# Patient Record
Sex: FEMALE | Race: WHITE | NOT HISPANIC OR LATINO | Employment: UNEMPLOYED
[De-identification: names, ages, dates, MRNs, and addresses within clinical notes are randomized per-mention and may not be internally consistent; named-entity substitution may affect disease eponyms.]

---

## 2018-05-29 ENCOUNTER — RECORDS - HEALTHEAST (OUTPATIENT)
Dept: ADMINISTRATIVE | Facility: OTHER | Age: 53
End: 2018-05-29

## 2018-07-19 ENCOUNTER — RECORDS - HEALTHEAST (OUTPATIENT)
Dept: ADMINISTRATIVE | Facility: OTHER | Age: 53
End: 2018-07-19

## 2018-07-31 ENCOUNTER — RECORDS - HEALTHEAST (OUTPATIENT)
Dept: ADMINISTRATIVE | Facility: OTHER | Age: 53
End: 2018-07-31

## 2018-08-01 ENCOUNTER — RECORDS - HEALTHEAST (OUTPATIENT)
Dept: ADMINISTRATIVE | Facility: OTHER | Age: 53
End: 2018-08-01

## 2018-08-03 ENCOUNTER — RECORDS - HEALTHEAST (OUTPATIENT)
Dept: ADMINISTRATIVE | Facility: OTHER | Age: 53
End: 2018-08-03

## 2018-08-17 ENCOUNTER — INFUSION - HEALTHEAST (OUTPATIENT)
Dept: INFUSION THERAPY | Facility: CLINIC | Age: 53
End: 2018-08-17

## 2018-08-17 ENCOUNTER — RECORDS - HEALTHEAST (OUTPATIENT)
Dept: ADMINISTRATIVE | Facility: OTHER | Age: 53
End: 2018-08-17

## 2018-08-17 DIAGNOSIS — G25.81 RESTLESS LEGS SYNDROME (RLS): ICD-10-CM

## 2018-08-17 RX ORDER — TRAMADOL HYDROCHLORIDE 50 MG/1
50 TABLET ORAL EVERY 6 HOURS PRN
Status: SHIPPED | COMMUNITY
Start: 2018-08-17 | End: 2021-07-20

## 2018-09-04 ENCOUNTER — RECORDS - HEALTHEAST (OUTPATIENT)
Dept: ADMINISTRATIVE | Facility: OTHER | Age: 53
End: 2018-09-04

## 2018-09-07 ENCOUNTER — RECORDS - HEALTHEAST (OUTPATIENT)
Dept: ADMINISTRATIVE | Facility: OTHER | Age: 53
End: 2018-09-07

## 2020-04-08 ENCOUNTER — COMMUNICATION - HEALTHEAST (OUTPATIENT)
Dept: SCHEDULING | Facility: CLINIC | Age: 55
End: 2020-04-08

## 2020-04-09 ENCOUNTER — COMMUNICATION - HEALTHEAST (OUTPATIENT)
Dept: ADMINISTRATIVE | Facility: CLINIC | Age: 55
End: 2020-04-09

## 2020-04-09 ENCOUNTER — OFFICE VISIT - HEALTHEAST (OUTPATIENT)
Dept: FAMILY MEDICINE | Facility: CLINIC | Age: 55
End: 2020-04-09

## 2020-04-09 DIAGNOSIS — Z12.11 SCREEN FOR COLON CANCER: ICD-10-CM

## 2020-04-09 DIAGNOSIS — Z12.31 VISIT FOR SCREENING MAMMOGRAM: ICD-10-CM

## 2020-04-09 DIAGNOSIS — M54.6 LEFT-SIDED THORACIC BACK PAIN, UNSPECIFIED CHRONICITY: ICD-10-CM

## 2020-04-09 ASSESSMENT — MIFFLIN-ST. JEOR: SCORE: 1389.21

## 2021-05-28 ASSESSMENT — ASTHMA QUESTIONNAIRES: ACT_TOTALSCORE: 12

## 2021-06-04 VITALS — HEIGHT: 61 IN | BODY MASS INDEX: 35.87 KG/M2 | WEIGHT: 190 LBS

## 2021-06-07 NOTE — TELEPHONE ENCOUNTER
"Pt is calling    C/O sharp stabbing pain under her left rib and around to the back shoulder blade  Pain has occurred on and off x 2 weeks  Pain is now constant tonight  Tender to the touch. Touching does make the pain worsen.    C/O sweats and chills on and on.  Unsure if she has a  fever as she does not have a thermometer, but feels warm  Pain is a 7 on the pain scale.  Tylenol does help with the pain and the chills  Hasn't taken any Tylenol today.    No cough, nasal congestion, or runny nose  No chest pain or shortness of breath.  No urinary urgency, frequency, or dysuria  No vomiting  No recent injury      Reason for Disposition    Pain lasting > 10 minutes and over 50 years old    Answer Assessment - Initial Assessment Questions  1. LOCATION: \"Where does it hurt?\"       Left sided rib pain. Bottom rib.  Pain goes around to her back  2. RADIATION: \"Does the pain shoot anywhere else?\" (e.g., chest, back)      Radiates to her back  3. ONSET: \"When did the pain begin?\" (e.g., minutes, hours or days ago)       Started 2 weeks ago, off and on, and this evening has become constant  4. SUDDEN: \"Gradual or sudden onset?\"       Gradual onset that has worsened tonight and now is constant  5. PATTERN \"Does the pain come and go, or is it constant?\"     - If constant: \"Is it getting better, staying the same, or worsening?\"       (Note: Constant means the pain never goes away completely; most serious pain is constant and it progresses)      - If intermittent: \"How long does it last?\" \"Do you have pain now?\"      (Note: Intermittent means the pain goes away completely between bouts)      Constant pain  6. SEVERITY: \"How bad is the pain?\"  (e.g., Scale 1-10; mild, moderate, or severe)     - MILD (1-3): doesn't interfere with normal activities, abdomen soft and not tender to touch      - MODERATE (4-7): interferes with normal activities or awakens from sleep, tender to touch      - SEVERE (8-10): excruciating pain, doubled over, " "unable to do any normal activities        7 on the pain scale. Moderate pain  7. RECURRENT SYMPTOM: \"Have you ever had this type of abdominal pain before?\" If so, ask: \"When was the last time?\" and \"What happened that time?\"       No  8. AGGRAVATING FACTORS: \"Does anything seem to cause this pain?\" (e.g., foods, stress, alcohol)      Tender to the touch.  Seems to worsen when she pushes on the area  9. CARDIAC SYMPTOMS: \"Do you have any of the following symptoms: chest pain, difficulty breathing, sweating, nausea?\"      No chest pain, shortness of breath, or nausea  10. OTHER SYMPTOMS: \"Do you have any other symptoms?\" (e.g., fever, vomiting, diarrhea)        Diarrhea x 2 days, but stated that she does have IBS  11. PREGNANCY: \"Is there any chance you are pregnant?\" \"When was your last menstrual period?\"        No.  Menopausal.    Protocols used: ABDOMINAL PAIN - UPPER-A-OH    I advised her that she should be seen in the ED.  Patient would like to schedule a TV for tomorrow.  Protocol reviewed with patient.  I advised her to schedule a TV for first think in the AM.  Appt made for tomorrow at 0800 with Dr Fischer.  Encouraged her to call back and/or go to the ED with any increase in pain, pain not relieved or with home care measures discussed, or with any new or worsening signs, symptoms, or concerns.    Patient verbalized understanding.    Peggy Mauricio RN Triage Nurse Advisors    "

## 2021-06-07 NOTE — PROGRESS NOTES
"Mary Frank is a 54 y.o. female who is being evaluated via a billable telephone visit.      The patient has been notified of following:     \"This telephone visit will be conducted via a call between you and your physician/provider. We have found that certain health care needs can be provided without the need for a physical exam.  This service lets us provide the care you need with a short phone conversation.  If a prescription is necessary we can send it directly to your pharmacy.  If lab work is needed we can place an order for that and you can then stop by our lab to have the test done at a later time.    Telephone visits are billed at different rates depending on your insurance coverage. During this emergency period, for some insurers they may be billed the same as an in-person visit.  Please reach out to your insurance provider with any questions.    If during the course of the call the physician/provider feels a telephone visit is not appropriate, you will not be charged for this service.\"    Patient has given verbal consent to a Telephone visit? Yes    Patient would like to receive their AVS by AVS Preference: Daisy.    Mary Frank complains of    Chief Complaint   Patient presents with     Pain     Left sided pain that radiates form her ribs to her back.  stabbing pain for the past couple days     She is a new patient to [facility]. Her PCP is through [facility]. She reports that \"he doesn't seem to care\". She has a medical history of:  HTN, Diabetes, Back pain, costochondritis, back disc herniations.      She reports that she is having left sided stabbing pain that is radiating around to the left side of her back.  She reports that \"something hurts\" it is constant but the stabbing comes and goes. She denies any shortness of breath and reports history of asthma. She is having night sweats and chills. She is not sure if she has fevers. She has a side ache that is radiating. Bones are feeling tender " and sore to touch. Pain goes to below the shoulder blade.   Nurse triage advised patient to be seen in the ER, pt Reports that she was not told to go to the emergency department but to rest and take some Tylenol and schedule a telephone visit.Documentation supports that the nurse triage advised. Patient to go to the emergency department and patient had declined.    She didn't specifically having any shortness of breath, or cardiac source chest pain.But states that the pain in general is worsening.    I have reviewed and updated the patient's Past Medical History, Social History, Family History and Medication List.    ALLERGIES  Amoxicillin; Penicillins; and Tramadol    Additional provider notes: Advised patient that I cannot adequately assess her symptoms as a telephone visit and I recommend that she have a face-to-face visit either at [facility] or through her primary care provider.In general patient sounds well but is anxious. She does not sound short of breath.    Assessment/Plan:  1. Left-sided thoracic back pain, unspecified chronicity     2. Screen for colon cancer     3. Visit for screening mammogram       Patient having left-sided thoracic back pain. Quick review of her history reveals a history of back pain, joint pain, and several disc herniations. She also has a history of costochondritis. I'm not able to adequately evaluate patient as a telephone visit and I recommended that she be either seen in a clinic for face-to-face visit or present to urgent care for further evaluation.     Patient is in agreement with this plan. We will assist patient in scheduling a face-to-face appointment. I discussed with patient that she may be advised presents emergency department.          Phone call duration:  11 minutes    Ada Fischer, CNP

## 2021-06-07 NOTE — TELEPHONE ENCOUNTER
Left message to call back for: Pt to call back on mainline to schedule an appointment and also to add in her insurance information    Information to relay to patient:  Pt had a Video visit today with Ada Fischer, the provider stated the Pt should schedule an appointment to be seen by any provider at the [facility] clinic who is seen Pts in clinic.    Please help her schedule an OV when she calls back.    Also we do not have any insurance information on file.  Please obtain insurance information into registration.

## 2021-06-16 PROBLEM — G25.81 RESTLESS LEGS SYNDROME (RLS): Status: ACTIVE | Noted: 2018-08-02

## 2021-06-19 NOTE — PROGRESS NOTES
Pt here for injectafer infusion. IV placed without difficulty. VSS. She c/o of mild nausea following infusion, relieved by peppermint essential oil. 30 min post VSS and IV d/c'd. She left ambulatory per self. Katrina Rader

## 2021-06-26 ENCOUNTER — HEALTH MAINTENANCE LETTER (OUTPATIENT)
Age: 56
End: 2021-06-26

## 2021-07-20 ENCOUNTER — HOSPITAL ENCOUNTER (OUTPATIENT)
Facility: CLINIC | Age: 56
Setting detail: OBSERVATION
Discharge: HOME | End: 2021-07-21
Attending: STUDENT IN AN ORGANIZED HEALTH CARE EDUCATION/TRAINING PROGRAM | Admitting: INTERNAL MEDICINE

## 2021-07-20 ENCOUNTER — HOSPITAL ENCOUNTER (EMERGENCY)
Dept: CT IMAGING | Facility: CLINIC | Age: 56
End: 2021-07-20

## 2021-07-20 DIAGNOSIS — R11.2 NAUSEA WITH VOMITING: ICD-10-CM

## 2021-07-20 DIAGNOSIS — K29.70 GASTRITIS, PRESENCE OF BLEEDING UNSPECIFIED, UNSPECIFIED CHRONICITY, UNSPECIFIED GASTRITIS TYPE: ICD-10-CM

## 2021-07-20 DIAGNOSIS — K92.2 UPPER GI BLEED: ICD-10-CM

## 2021-07-20 DIAGNOSIS — I10 ESSENTIAL HYPERTENSION: Primary | ICD-10-CM

## 2021-07-20 DIAGNOSIS — R06.02 SHORTNESS OF BREATH: ICD-10-CM

## 2021-07-20 DIAGNOSIS — R07.9 CHEST PAIN, UNSPECIFIED TYPE: ICD-10-CM

## 2021-07-20 LAB
ALBUMIN SERPL-MCNC: 4.1 G/DL (ref 3.5–5)
ALP SERPL-CCNC: 117 U/L (ref 45–120)
ALT SERPL W P-5'-P-CCNC: 16 U/L (ref 0–45)
ANION GAP SERPL CALCULATED.3IONS-SCNC: 17 MMOL/L (ref 5–18)
AST SERPL W P-5'-P-CCNC: 15 U/L (ref 0–40)
BASOPHILS # BLD AUTO: 0 10E3/UL (ref 0–0.2)
BASOPHILS NFR BLD AUTO: 0 %
BILIRUB DIRECT SERPL-MCNC: 0.2 MG/DL
BILIRUB SERPL-MCNC: 1.3 MG/DL (ref 0–1)
BUN SERPL-MCNC: 11 MG/DL (ref 8–22)
CALCIUM SERPL-MCNC: 9.2 MG/DL (ref 8.5–10.5)
CHLORIDE BLD-SCNC: 102 MMOL/L (ref 98–107)
CO2 SERPL-SCNC: 19 MMOL/L (ref 22–31)
CREAT SERPL-MCNC: 0.74 MG/DL (ref 0.6–1.1)
D DIMER PPP FEU-MCNC: 0.51 UG/ML FEU (ref 0–0.5)
EOSINOPHIL # BLD AUTO: 0 10E3/UL (ref 0–0.7)
EOSINOPHIL NFR BLD AUTO: 0 %
ERYTHROCYTE [DISTWIDTH] IN BLOOD BY AUTOMATED COUNT: 14.1 % (ref 10–15)
ETHANOL SERPL-MCNC: <10 MG/DL
GFR SERPL CREATININE-BSD FRML MDRD: >90 ML/MIN/1.73M2
GLUCOSE BLD-MCNC: 201 MG/DL (ref 70–125)
HCT VFR BLD AUTO: 41.3 % (ref 35–47)
HGB BLD-MCNC: 12.2 G/DL (ref 11.7–15.7)
HGB BLD-MCNC: 12.6 G/DL (ref 11.7–15.7)
HGB BLD-MCNC: 14 G/DL (ref 11.7–15.7)
HOLD SPECIMEN: NORMAL
IMM GRANULOCYTES # BLD: 0.1 10E3/UL
IMM GRANULOCYTES NFR BLD: 1 %
LIPASE SERPL-CCNC: 9 U/L (ref 0–52)
LYMPHOCYTES # BLD AUTO: 1.1 10E3/UL (ref 0.8–5.3)
LYMPHOCYTES NFR BLD AUTO: 10 %
MAGNESIUM SERPL-MCNC: 1.5 MG/DL (ref 1.8–2.6)
MAGNESIUM SERPL-MCNC: 2.3 MG/DL (ref 1.8–2.6)
MCH RBC QN AUTO: 27.9 PG (ref 26.5–33)
MCHC RBC AUTO-ENTMCNC: 33.9 G/DL (ref 31.5–36.5)
MCV RBC AUTO: 82 FL (ref 78–100)
MONOCYTES # BLD AUTO: 0.6 10E3/UL (ref 0–1.3)
MONOCYTES NFR BLD AUTO: 5 %
NEUTROPHILS # BLD AUTO: 9 10E3/UL (ref 1.6–8.3)
NEUTROPHILS NFR BLD AUTO: 84 %
NRBC # BLD AUTO: 0 10E3/UL
NRBC BLD AUTO-RTO: 0 /100
PLATELET # BLD AUTO: 306 10E3/UL (ref 150–450)
POTASSIUM BLD-SCNC: 3.3 MMOL/L (ref 3.5–5)
POTASSIUM BLD-SCNC: 3.6 MMOL/L (ref 3.5–5)
PROT SERPL-MCNC: 7.3 G/DL (ref 6–8)
RBC # BLD AUTO: 5.02 10E6/UL (ref 3.8–5.2)
SARS-COV-2 RNA RESP QL NAA+PROBE: NEGATIVE
SODIUM SERPL-SCNC: 138 MMOL/L (ref 136–145)
TROPONIN I SERPL-MCNC: <0.01 NG/ML (ref 0–0.29)
TROPONIN I SERPL-MCNC: <0.01 NG/ML (ref 0–0.29)
TROPONIN T BLD-MCNC: 0 UG/L
WBC # BLD AUTO: 10.7 10E3/UL (ref 4–11)

## 2021-07-20 PROCEDURE — 84132 ASSAY OF SERUM POTASSIUM: CPT | Performed by: INTERNAL MEDICINE

## 2021-07-20 PROCEDURE — 84484 ASSAY OF TROPONIN QUANT: CPT | Performed by: PHYSICIAN ASSISTANT

## 2021-07-20 PROCEDURE — C9803 HOPD COVID-19 SPEC COLLECT: HCPCS

## 2021-07-20 PROCEDURE — 93005 ELECTROCARDIOGRAM TRACING: CPT | Performed by: EMERGENCY MEDICINE

## 2021-07-20 PROCEDURE — G0378 HOSPITAL OBSERVATION PER HR: HCPCS

## 2021-07-20 PROCEDURE — C9113 INJ PANTOPRAZOLE SODIUM, VIA: HCPCS | Performed by: PHYSICIAN ASSISTANT

## 2021-07-20 PROCEDURE — 36592 COLLECT BLOOD FROM PICC: CPT | Performed by: STUDENT IN AN ORGANIZED HEALTH CARE EDUCATION/TRAINING PROGRAM

## 2021-07-20 PROCEDURE — 83735 ASSAY OF MAGNESIUM: CPT | Performed by: PHYSICIAN ASSISTANT

## 2021-07-20 PROCEDURE — 96375 TX/PRO/DX INJ NEW DRUG ADDON: CPT

## 2021-07-20 PROCEDURE — 85025 COMPLETE CBC W/AUTO DIFF WBC: CPT | Performed by: PHYSICIAN ASSISTANT

## 2021-07-20 PROCEDURE — 96361 HYDRATE IV INFUSION ADD-ON: CPT

## 2021-07-20 PROCEDURE — 71275 CT ANGIOGRAPHY CHEST: CPT

## 2021-07-20 PROCEDURE — 87635 SARS-COV-2 COVID-19 AMP PRB: CPT | Performed by: PHYSICIAN ASSISTANT

## 2021-07-20 PROCEDURE — 93005 ELECTROCARDIOGRAM TRACING: CPT | Performed by: STUDENT IN AN ORGANIZED HEALTH CARE EDUCATION/TRAINING PROGRAM

## 2021-07-20 PROCEDURE — 83690 ASSAY OF LIPASE: CPT | Performed by: PHYSICIAN ASSISTANT

## 2021-07-20 PROCEDURE — 250N000013 HC RX MED GY IP 250 OP 250 PS 637: Performed by: PHYSICIAN ASSISTANT

## 2021-07-20 PROCEDURE — 250N000011 HC RX IP 250 OP 636: Performed by: PHYSICIAN ASSISTANT

## 2021-07-20 PROCEDURE — 36415 COLL VENOUS BLD VENIPUNCTURE: CPT | Performed by: STUDENT IN AN ORGANIZED HEALTH CARE EDUCATION/TRAINING PROGRAM

## 2021-07-20 PROCEDURE — 85018 HEMOGLOBIN: CPT | Performed by: INTERNAL MEDICINE

## 2021-07-20 PROCEDURE — 99285 EMERGENCY DEPT VISIT HI MDM: CPT | Mod: 25

## 2021-07-20 PROCEDURE — 258N000003 HC RX IP 258 OP 636: Performed by: PHYSICIAN ASSISTANT

## 2021-07-20 PROCEDURE — 99220 PR INITIAL OBSERVATION CARE,LEVEL III: CPT | Performed by: INTERNAL MEDICINE

## 2021-07-20 PROCEDURE — 36592 COLLECT BLOOD FROM PICC: CPT | Performed by: PHYSICIAN ASSISTANT

## 2021-07-20 PROCEDURE — 96365 THER/PROPH/DIAG IV INF INIT: CPT | Mod: 59

## 2021-07-20 PROCEDURE — 85379 FIBRIN DEGRADATION QUANT: CPT | Performed by: PHYSICIAN ASSISTANT

## 2021-07-20 PROCEDURE — 250N000013 HC RX MED GY IP 250 OP 250 PS 637: Performed by: STUDENT IN AN ORGANIZED HEALTH CARE EDUCATION/TRAINING PROGRAM

## 2021-07-20 PROCEDURE — 82077 ASSAY SPEC XCP UR&BREATH IA: CPT | Performed by: PHYSICIAN ASSISTANT

## 2021-07-20 PROCEDURE — 258N000003 HC RX IP 258 OP 636: Performed by: INTERNAL MEDICINE

## 2021-07-20 PROCEDURE — 84484 ASSAY OF TROPONIN QUANT: CPT | Performed by: INTERNAL MEDICINE

## 2021-07-20 PROCEDURE — 250N000009 HC RX 250: Performed by: PHYSICIAN ASSISTANT

## 2021-07-20 PROCEDURE — 83735 ASSAY OF MAGNESIUM: CPT | Performed by: INTERNAL MEDICINE

## 2021-07-20 PROCEDURE — 80053 COMPREHEN METABOLIC PANEL: CPT | Performed by: PHYSICIAN ASSISTANT

## 2021-07-20 PROCEDURE — 36415 COLL VENOUS BLD VENIPUNCTURE: CPT | Performed by: INTERNAL MEDICINE

## 2021-07-20 PROCEDURE — 250N000013 HC RX MED GY IP 250 OP 250 PS 637: Performed by: INTERNAL MEDICINE

## 2021-07-20 RX ORDER — ONDANSETRON 2 MG/ML
4 INJECTION INTRAMUSCULAR; INTRAVENOUS ONCE
Status: COMPLETED | OUTPATIENT
Start: 2021-07-20 | End: 2021-07-20

## 2021-07-20 RX ORDER — SUCRALFATE 1 G/1
1 TABLET ORAL ONCE
Status: COMPLETED | OUTPATIENT
Start: 2021-07-20 | End: 2021-07-20

## 2021-07-20 RX ORDER — SODIUM CHLORIDE 9 MG/ML
INJECTION, SOLUTION INTRAVENOUS CONTINUOUS
Status: DISCONTINUED | OUTPATIENT
Start: 2021-07-20 | End: 2021-07-21

## 2021-07-20 RX ORDER — POTASSIUM CHLORIDE 1.5 G/1.58G
40 POWDER, FOR SOLUTION ORAL ONCE
Status: COMPLETED | OUTPATIENT
Start: 2021-07-20 | End: 2021-07-20

## 2021-07-20 RX ORDER — ACETAMINOPHEN 650 MG/1
650 SUPPOSITORY RECTAL EVERY 4 HOURS PRN
Status: DISCONTINUED | OUTPATIENT
Start: 2021-07-20 | End: 2021-07-21 | Stop reason: HOSPADM

## 2021-07-20 RX ORDER — ACETAMINOPHEN 325 MG/1
650 TABLET ORAL EVERY 4 HOURS PRN
Status: DISCONTINUED | OUTPATIENT
Start: 2021-07-20 | End: 2021-07-21 | Stop reason: HOSPADM

## 2021-07-20 RX ORDER — LIDOCAINE 40 MG/G
CREAM TOPICAL
Status: DISCONTINUED | OUTPATIENT
Start: 2021-07-20 | End: 2021-07-21 | Stop reason: HOSPADM

## 2021-07-20 RX ORDER — MAGNESIUM SULFATE HEPTAHYDRATE 40 MG/ML
2 INJECTION, SOLUTION INTRAVENOUS ONCE
Status: COMPLETED | OUTPATIENT
Start: 2021-07-20 | End: 2021-07-20

## 2021-07-20 RX ORDER — DIPHENHYDRAMINE HYDROCHLORIDE 50 MG/ML
25 INJECTION INTRAMUSCULAR; INTRAVENOUS ONCE
Status: COMPLETED | OUTPATIENT
Start: 2021-07-20 | End: 2021-07-20

## 2021-07-20 RX ORDER — ONDANSETRON 2 MG/ML
4 INJECTION INTRAMUSCULAR; INTRAVENOUS EVERY 6 HOURS PRN
Status: DISCONTINUED | OUTPATIENT
Start: 2021-07-20 | End: 2021-07-21 | Stop reason: HOSPADM

## 2021-07-20 RX ORDER — ONDANSETRON 4 MG/1
4 TABLET, ORALLY DISINTEGRATING ORAL EVERY 8 HOURS PRN
Qty: 10 TABLET | Refills: 0 | Status: SHIPPED | OUTPATIENT
Start: 2021-07-20 | End: 2021-07-23

## 2021-07-20 RX ORDER — METOCLOPRAMIDE HYDROCHLORIDE 5 MG/ML
10 INJECTION INTRAMUSCULAR; INTRAVENOUS ONCE
Status: COMPLETED | OUTPATIENT
Start: 2021-07-20 | End: 2021-07-20

## 2021-07-20 RX ORDER — AMLODIPINE BESYLATE 5 MG/1
5 TABLET ORAL 2 TIMES DAILY
Qty: 60 TABLET | Refills: 0 | Status: SHIPPED | OUTPATIENT
Start: 2021-07-20 | End: 2021-07-21

## 2021-07-20 RX ORDER — AMLODIPINE BESYLATE 5 MG/1
5 TABLET ORAL 2 TIMES DAILY
Status: DISCONTINUED | OUTPATIENT
Start: 2021-07-20 | End: 2021-07-21 | Stop reason: HOSPADM

## 2021-07-20 RX ORDER — POTASSIUM CHLORIDE 20MEQ/15ML
40 LIQUID (ML) ORAL ONCE
Status: DISCONTINUED | OUTPATIENT
Start: 2021-07-20 | End: 2021-07-20

## 2021-07-20 RX ORDER — ALBUTEROL SULFATE 90 UG/1
1-2 AEROSOL, METERED RESPIRATORY (INHALATION) EVERY 4 HOURS PRN
Status: DISCONTINUED | OUTPATIENT
Start: 2021-07-20 | End: 2021-07-21 | Stop reason: HOSPADM

## 2021-07-20 RX ORDER — IOPAMIDOL 755 MG/ML
100 INJECTION, SOLUTION INTRAVASCULAR ONCE
Status: COMPLETED | OUTPATIENT
Start: 2021-07-20 | End: 2021-07-20

## 2021-07-20 RX ADMIN — LIDOCAINE HYDROCHLORIDE 30 ML: 20 SOLUTION ORAL; TOPICAL at 13:24

## 2021-07-20 RX ADMIN — SODIUM CHLORIDE: 9 INJECTION, SOLUTION INTRAVENOUS at 19:08

## 2021-07-20 RX ADMIN — ONDANSETRON 4 MG: 2 INJECTION INTRAMUSCULAR; INTRAVENOUS at 09:41

## 2021-07-20 RX ADMIN — PANTOPRAZOLE SODIUM 40 MG: 40 INJECTION, POWDER, FOR SOLUTION INTRAVENOUS at 09:43

## 2021-07-20 RX ADMIN — POTASSIUM CHLORIDE 40 MEQ: 1.5 POWDER, FOR SOLUTION ORAL at 12:05

## 2021-07-20 RX ADMIN — ACETAMINOPHEN 650 MG: 325 TABLET ORAL at 19:46

## 2021-07-20 RX ADMIN — SODIUM CHLORIDE 125 ML/HR: 9 INJECTION, SOLUTION INTRAVENOUS at 12:05

## 2021-07-20 RX ADMIN — IOPAMIDOL 100 ML: 755 INJECTION, SOLUTION INTRAVENOUS at 12:00

## 2021-07-20 RX ADMIN — SUCRALFATE 1 G: 1 TABLET ORAL at 13:47

## 2021-07-20 RX ADMIN — METOCLOPRAMIDE HYDROCHLORIDE 10 MG: 5 INJECTION INTRAMUSCULAR; INTRAVENOUS at 13:41

## 2021-07-20 RX ADMIN — MAGNESIUM SULFATE HEPTAHYDRATE 2 G: 40 INJECTION, SOLUTION INTRAVENOUS at 10:29

## 2021-07-20 RX ADMIN — DIPHENHYDRAMINE HYDROCHLORIDE 25 MG: 50 INJECTION INTRAMUSCULAR; INTRAVENOUS at 13:41

## 2021-07-20 RX ADMIN — LIDOCAINE HYDROCHLORIDE 30 ML: 20 SOLUTION ORAL; TOPICAL at 10:24

## 2021-07-20 RX ADMIN — SODIUM CHLORIDE 1000 ML: 9 INJECTION, SOLUTION INTRAVENOUS at 09:37

## 2021-07-20 RX ADMIN — AMLODIPINE BESYLATE 5 MG: 5 TABLET ORAL at 23:45

## 2021-07-20 ASSESSMENT — ENCOUNTER SYMPTOMS
FREQUENCY: 0
SORE THROAT: 0
TROUBLE SWALLOWING: 0
LIGHT-HEADEDNESS: 1
BACK PAIN: 0
WEAKNESS: 0
NAUSEA: 1
FEVER: 0
HEADACHES: 0
DYSURIA: 0
EYE DISCHARGE: 0
DIARRHEA: 0
HEMATURIA: 0
WOUND: 0
CHILLS: 1
ABDOMINAL PAIN: 1
NECK PAIN: 0
COUGH: 0
SHORTNESS OF BREATH: 1
NUMBNESS: 0
COUGH: 1
VOMITING: 1

## 2021-07-20 ASSESSMENT — ACTIVITIES OF DAILY LIVING (ADL)
CONCENTRATING,_REMEMBERING_OR_MAKING_DECISIONS_DIFFICULTY: NO
DOING_ERRANDS_INDEPENDENTLY_DIFFICULTY: NO
EATING/SWALLOWING: EATING
TOILETING_ISSUES: NO
DIFFICULTY_EATING/SWALLOWING: YES
HEARING_DIFFICULTY_OR_DEAF: NO
WALKING_OR_CLIMBING_STAIRS_DIFFICULTY: NO
DRESSING/BATHING_DIFFICULTY: NO
DIFFICULTY_COMMUNICATING: NO
FALL_HISTORY_WITHIN_LAST_SIX_MONTHS: NO

## 2021-07-20 ASSESSMENT — MIFFLIN-ST. JEOR
SCORE: 1283.75
SCORE: 1268.16

## 2021-07-20 NOTE — PLAN OF CARE
PRIMARY DIAGNOSIS: CHEST PAIN  OUTPATIENT/OBSERVATION GOALS TO BE MET BEFORE DISCHARGE:  1. Ruled out acute coronary syndrome (negative or stable Troponin): First 2 troponin <0.01  2. Pain Status: Pt c/o abdominal pain from multiple emesis. No emesis has been witnessed by staff since admit to unit.  3. Appropriate provocative testing performed: ECHO scheduled for am.  - Stress Test Procedure:NA  - Interpretation of cardiac rhythm per telemetry tech: NSR    4. Cleared by Consultants (if applicable): GI and cards consults ordered.  5. Return to near baseline physical activity: Pt up ad chito to BR  Discharge Planner Nurse   Safe discharge environment identified: Discharge to home when stable.  Barriers to discharge: Non specific EKG changes       Entered by: Judy Mesa 07/20/2021 5:04 PM     Please review provider order for any additional goals.   Nurse to notify provider when observation goals have been met and patient is ready for discharge.

## 2021-07-20 NOTE — ED NOTES
Pt refusing Covid test. Pt veins very small and flat so IV number 22 started in hand. Pt informed we may need larger IV if D dimer elevated. Verbalized understanding, just wanted nausea meds asap.

## 2021-07-20 NOTE — H&P
[facility] MEDICINE ADMISSION HISTORY AND PHYSICAL     Assessment & Plan      Mary Frank is a 55 year old old female with history of hypertension, GERD, hiatal hernia that has been surgically corrected twice in the past but she says has failed so far, presented with hematemesis and intractable nausea and vomiting last night after she drank moonshine.    Assessment & Plan    Intractable nausea and vomiting with hematemesis  -Exacerbated by hiatal hernia  -Follow hemoglobin  -So far no significant blood loss based on hemoglobin  -IV Protonix initiated  -This seems to have been triggered by intake of alcohol although patient says she has no history of alcohol abuse.  -GI consult  -ER reports that there was no episode of hematemesis observed during her ER stay.    Chest pain with nonspecific ST-T wave changes EKG  -Chest pain seems to be chronic rather than acute this has been going on for 2 months  -Does not seem to be typical as this happens even at rest  -Consider getting echocardiogram or stress test  -Serial troponins.  Initial troponin is negative.    Hiatal hernia  -Had an open correction of that at the age of 28.    -She had a laparoscopic repair again 3 years ago.  -PPI  -Had improvement with a GI cocktail    Essential hypertension  -Patient is on amlodipine and losartan but has not been compliant with medications because of insurance issues  -Optimize medications to control blood pressure if clinically indicated.    Hypokalemia and hypomagnesemia  -Replace per protocol  -Give IV fluids    Medications include Mestinon and tramadol and Norflex with patient says she is not on this medication and does not know the reason for these medications on her med list.  She has no chronic pain.  She has no history of myasthenia gravis as far she is concerned.    DVTP: Low Risk Patient  ; Will not give aspirin or anticoagulant because of the reported hematemesis.  Code Status: Full  code  Disposition: Observation   Goals for the hospitalization: Evaluate the hematemesis.  Evaluate the chest pain.    Chief Complaint Nausea, vomiing and left arm numbness; chest pain, cugh, chills. shorntes sof breath     HISTORY     Mary Frank is a 55 year old old female with h/o above p/w hematemesis    Patient drank moonshine yesterday around 5:56 PM.  She does have history of hiatal hernia and GERD in the past.  She has had 2 surgical repair of the hiatal hernia which she said has failed.  Yesterday after drinking lunch and she started having intractable nausea and vomiting with blood in her vomitus.  She said it was reddish but she did not have melena.  She said it was multiple times.      She also says that she has some loose stools but this is also been going on for some time.      She complains of some epigastric discomfort associated with the nausea and vomiting.    She still had episodes of nausea and bloody vomitus the following day prompting her to go to the ER today.  Her hemoglobin in the ER was normal.  She blames her hiatal hernia as the reason for the intractable nausea and vomiting.    Patient has not noted any melena in her stool previously.  She did say that she had stomach ulcers in the past.  She said that the repair of her hiatal hernia has failed, initially open surgery at age 28 then laparoscopic repair a few years ago.    Additionally she also complained of chest pain which has been going on for the past 2 months.  It occurs at rest and not necessarily related to movement.  She does not think it is heartburn or hiatal hernia related.  KG showed normal sinus rhythm with nonspecific ST-T wave changes.  Initial troponin was negative.  She has intermittent shortness of breath.  No tenderness or swelling of the calves however.  She denies any other respiratory symptoms such as cough or fever.  She does have risk factors notably hypertension dyslipidemia and type 2 diabetes with patient says  she has not been compliant with medications because of financial issues.    In the ER patient was given Protonix and GI cocktail which improved her symptoms.  D-dimer was elevated but CTA was negative for PE.  Patient also had normal hemoglobin.  Also mild hypokalemia and low magnesium.      Past Medical History     Past Medical History:  4/6/2015: Abdominal pain      Comment:  Overview:  CT ABDOMEN PELVIS [facility]WO, 4/6/2015 Impression:                No evidence of renal or ureteral calculus. No evidence of               a suspicious mass or inflammatory change within the                urinary system.   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 5 other adenomatous polyps. One               of the six adenomas was advanced based on size and                appearance under the microscope.  It is recommended based               on this along with the number of polyps that a                colonoscopy be repeated in one year.  Random colon                biopsies do not show colitis.    5/23/2013: Hyperlipidemia      Comment:  Overview:  She has dyslipidemia with elevated                triglycerides on 5-23-13.   6/13/2015: Lipoma      Comment:  Overview:    4/29/2015: Nonspecific abnormal finding in stool contents      Comment:  Overview:  OCCULT BLOOD IFOBT STOOL was positive on                4-29-15.   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 No pertinent surgical history.  Family History    Reviewed, and History reviewed. No pertinent family history.     Social  History      Social History     Tobacco Use     Smoking status: Former Smoker     Years: 2.00     Types: Cigarettes, Cigarettes     Quit date: 1994     Years since quittin.7     Smokeless tobacco: Never Used   Substance Use Topics     Alcohol use: Not Currently     Drug use: Not Currently      Allergies     Allergies   Allergen Reactions     Amoxicillin Palpitations     Penicillins Palpitations     Tramadol Unknown     Prior to Admission Medications      Prior to Admission Medications   Prescriptions Last Dose Informant Patient Reported? Taking?   HYDROcodone-acetaminophen 5-325 mg per tablet   No No   Sig: [HYDROCODONE-ACETAMINOPHEN 5-325 MG PER TABLET] Take 1 tablet by mouth every 4 (four) hours as needed for pain.   HYDROcodone-acetaminophen 5-325 mg per tablet   No No   Sig: [HYDROCODONE-ACETAMINOPHEN 5-325 MG PER TABLET] Take 1 tablet by mouth every 4 (four) hours as needed for pain.   albuterol (PROVENTIL HFA;VENTOLIN HFA) 90 mcg/actuation inhaler   Yes No   Sig: [ALBUTEROL (PROVENTIL HFA;VENTOLIN HFA) 90 MCG/ACTUATION INHALER] Inhale 1-2 puffs every 4 (four) hours as needed for wheezing or shortness of breath.   amLODIPine (NORVASC) 5 MG tablet   Yes No   Sig: [AMLODIPINE (NORVASC) 5 MG TABLET] Take 5 mg by mouth 2 (two) times a day.    amLODIPine (NORVASC) 5 MG tablet   No Yes   Sig: Take 1 tablet (5 mg) by mouth 2 times daily   ergocalciferol (ERGOCALCIFEROL) 50,000 unit capsule   Yes No   Sig: [ERGOCALCIFEROL (ERGOCALCIFEROL) 50,000 UNIT CAPSULE] Take 50,000 Units by mouth 2 (two) times a week. (on Monday and Thursday)   fluticasone (FLOVENT HFA) 110 mcg/actuation inhaler   Yes No   Sig: [FLUTICASONE (FLOVENT HFA) 110 MCG/ACTUATION INHALER] Inhale 2 puffs 2 (two) times a day as needed.   losartan (COZAAR) 25 MG tablet   Yes No   Sig: [LOSARTAN (COZAAR) 25 MG TABLET] Take 25 mg by mouth 2 (two) times a day.    omeprazole (PRILOSEC) 20 MG capsule   Yes No   Sig: [OMEPRAZOLE (PRILOSEC) 20 MG  CAPSULE] Take 20 mg by mouth daily before breakfast.   ondansetron (ZOFRAN) 8 MG tablet   Yes No   Sig: [ONDANSETRON (ZOFRAN) 8 MG TABLET] Take 8 mg by mouth every 8 (eight) hours as needed for nausea.   orphenadrine (NORFLEX) 100 mg tablet   No No   Sig: [ORPHENADRINE (NORFLEX) 100 MG TABLET] Take 1 tablet (100 mg total) by mouth 2 (two) times a day.   pyridostigmine (MESTINON TIMESPAN) 180 mg CR tablet   Yes No   Sig: [PYRIDOSTIGMINE (MESTINON TIMESPAN) 180 MG CR TABLET] Take 180 mg by mouth every evening.    simethicone (GAS RELIEF) 180 mg capsule   Yes No   Sig: [SIMETHICONE (GAS RELIEF) 180 MG CAPSULE] Take 180 mg by mouth 3 (three) times a day as needed for flatulence.    traMADol (ULTRAM) 50 mg tablet   Yes No   Sig: [TRAMADOL (ULTRAM) 50 MG TABLET] Take 50 mg by mouth every 6 (six) hours as needed for pain.      Facility-Administered Medications: None      Review of Systems     A 12 point comprehensive review of systems was negative except as noted above in HPI.    PHYSICAL EXAMINATION       Vitals      Temp:  [97.8  F (36.6  C)] 97.8  F (36.6  C)  Pulse:  [78-86] 80  Resp:  [7-35] 12  BP: (125-183)/() 170/84  SpO2:  [95 %-100 %] 95 %    Examination     GENERAL:  Alert, appears comfortable, in no acute distress, appears stated age   HEAD:  Normocephalic, without obvious abnormality, atraumatic   EYES:   Grossly normal   NOSE: Nares normal, septum midline, mucosa normal, no drainage   THROAT: Lips, mucosa, and tongue normal; teeth and gums normal, mouth moist   NECK:  No neck stiffness   BACK:    Grossly normal   LUNGS:   Clear to auscultation bilaterally, no rales, rhonchi, or wheezing, symmetric chest rise on inhalation, respirations unlabored   CHEST WALL:  No tenderness or deformity   HEART:  Regular rate and rhythm, S1 and S2 normal, no murmur, rub, or gallop    ABDOMEN:   Soft, lightly tender in the epigastric area but no guarding no peritoneal signs, bowel sounds active all four quadrants, no  masses, no organomegaly, no rebound or guarding   EXTREMITIES: Extremities normal, atraumatic, no cyanosis or edema    SKIN: Dry to touch, no exanthems in the visualized areas   NEURO: Alert, oriented x 4, moves all four extremities freely, non-focal   PSYCH: Cooperative, behavior is appropriate      Pertinent Lab   Potassium 3.3  LFTs normal  Lipase normal  Troponin less than 0.01  Glucose 201  CBC unremarkable hemoglobin of 14.0, hematocrit 41.3  SARS-CoV-2 PCR negative  CTA chest PE run negative, moderate-sized hiatal hernia seen.  Alcohol blood less than 10      Pertinent Radiology     Radiology Results:   Recent Results (from the past 24 hour(s))   CT Chest Pulmonary Embolism w Contrast    Narrative    EXAM: CT CHEST PULMONARY EMBOLISM W CONTRAST  LOCATION: [facility]   DATE/TIME: 7/20/2021 11:58 AM    INDICATION: PE suspected, low/intermediate prob, positive D-dimer, chest pain  COMPARISON: CT abdomen and pelvis from 1/29/2021.  TECHNIQUE: CT chest pulmonary angiogram during arterial phase injection of IV contrast. Multiplanar reformats and MIP reconstructions were performed. Dose reduction techniques were used.   CONTRAST: 100 cc of [id number]    FINDINGS:  ANGIOGRAM CHEST: No pulmonary artery filling defects. The aorta is normal in caliber.    LUNGS AND PLEURA: Mild dependent atelectasis. No effusions or suspicious pulmonary nodules.    MEDIASTINUM/AXILLAE: Heart size is normal. No adenopathy.     CORONARY ARTERY CALCIFICATION: None.    UPPER ABDOMEN: Moderate-sized hiatal hernia.    MUSCULOSKELETAL: Degenerative changes of the spine.      Impression    IMPRESSION:  1.  No pulmonary embolus or other acute findings.  2.  Moderate-sized hiatal hernia.     EKG Results: personally reviewed.  and Normal sinus rhythm.  Nonspecific ST-T wave changes.  Borderline QT    Advance Care Planning        Rodrigue Cervantes MD  Hospitalist  [facility]  [facility]    Phone: #[phone]

## 2021-07-20 NOTE — ED PROVIDER NOTES
At this point I agree with the current assessment done in the Emergency Department.     I, Ayde Schuler MD have reviewed the documentation, personally taken the patient's history, performed an exam and agree with the physical finds, diagnosis and management plan.     Patient is seen with Chen Franco PA-C. Mary Frank is a 55 year old female, with a history of asthma HLD, HTN, GERD, DM2, and s/p hernia repair, who presents to the Emergency Department for the evaluation of chest pain. Since 10:00 PM (12 hours ago), patient reports chest pain, shortness of breath, upper abdominal pain, nausea, emesis, lightheadedness, and chills. Patient states she drank a large amount of a mixed alcoholic beverage yesterday and ate moonshine peaches. She denies regular alcohol use. She does endorse daily marijuana use.    Patient also endorses cough for the past month and tingling sensations to bilateral hands. Of note, patient has not been taking her HTN, HLD, and DM2 medications for the past several months due to insurance issues. No other complaints at this time.    ROS: Review of Systems   Constitutional: Positive for chills.   Respiratory: Positive for cough and shortness of breath.    Cardiovascular: Positive for chest pain.   Gastrointestinal: Positive for abdominal pain, nausea and vomiting.   Neurological: Positive for light-headedness.        Positive for tingling sensations to bilateral hands.   All other systems reviewed and are negative.       Social: Endorses current alcohol use and daily marijuana use.     Physical Exam:   Constitutional: Well developed, well nourished. No acute distress  HENT: Normocephalic, atraumatic, mucous membranes moist. Neck- gross ROM intact.   Eyes: Pupils mid-range, sclera white, no discharge  Respiratory: Clear to auscultation bilaterally, no respiratory distress, no wheezing, speaks full sentences easily.  Cardiovascular: Normal heart rate, regular rhythm, no murmurs. No lower  extremity edema, 2+ radial pulses.   GI: Soft, no tenderness to deep palpation in all quadrants, no masses.  Musculoskeletal: Moving all 4 extremities intentionally and without pain. No obvious deformity.  Skin: Warm, dry, no rash.  Neurologic: Alert & oriented x 3, speech clear, moving all extremities spontaneously. Strength and sensation intact bilateral upper extremities.   Psychiatric: Affect normal, cooperative.      MDM:   54 y/o F who presents with hematemesis and chest pain. [place] and last night started feeling unwell with vomiting bright red blood several times. Also reporting left sided chest pain, tells me she has had this for 3 years after a surgery but has been worsening lately. Her EKG is sinus does have some nonspecific new T wave changes in some of the anterior/lateral leads, troponin negative, onset >6 hours ago do have lower suspicion for ACS, but HEART score is 4. Given natue of her pain I do not suspect dissection. D-dimer slightly elevated, CTA scan negative for PE or acute findings. Potassium and magnesium replaced, other labs generally reassuring including normal Hgb. She had one episode of darker brown stool here but no obvious hematemesis given this and stable vitals and Hgb I do not suspect concerning UGIB such as varices. She was given GI cocktail and felt much better of this, suspect compoenent of gastritis contributing to her symptoms. We discussed options given HEART score of 4 and EKG changes discussed observation admission and patient is in agreement with it.       10:27 AM Case staffed with me by Chen Franco PA-C.  10:33 AM I met with patient for initial interview and exam. PPE includes surgical mask and eye cover.    I, Kayla Thao, am serving as a scribe to document services personally performed by Ayde Schuler MD based on my observation and the provider's statements to me. I, Ayde Schuler MD, attest that Kayla Thao is  acting in a scribe capacity, has observed my performance of the services and has documented them in accordance with my direction.        Ayde Schuler MD  07/20/21 0366

## 2021-07-20 NOTE — ED NOTES
Chest Pain now 6/10. Slowly getting better. Provider updated. Feels it is epigastric pain at this time. Reproducible with palpation to epigastric area.

## 2021-07-20 NOTE — PHARMACY-ADMISSION MEDICATION HISTORY
Pharmacy Note - Admission Medication History    Pertinent Provider Information:   patient states her primary care provider plans to resume her amlodipine; pt has not taken for ~2months     ______________________________________________________________________    Prior To Admission (PTA) med list completed and updated in EMR.       Prior to Admission Medications   Prescriptions Last Dose Informant Patient Reported? Taking?   albuterol (PROVENTIL HFA;VENTOLIN HFA) 90 mcg/actuation inhaler Past Month at Unknown time  Yes Yes   Sig: [ALBUTEROL (PROVENTIL HFA;VENTOLIN HFA) 90 MCG/ACTUATION INHALER] Inhale 1-2 puffs every 4 (four) hours as needed for wheezing or shortness of breath.             amLODIPine (NORVASC) 5 MG tablet >2 months ago  No Yes   Sig: Take 1 tablet (5 mg) by mouth 2 times daily   omeprazole (PRILOSEC) 20 MG capsule 7/19/2021 at Unknown time  Yes Yes   Sig: [OMEPRAZOLE (PRILOSEC) 20 MG CAPSULE] Take 20 mg by mouth daily before breakfast.      Facility-Administered Medications: None       Information source(s): [facility]  Method of interview communication: in-person    Summary of Changes to PTA Med List  New: none  Discontinued: pyridostigmine (pt states never using / never heard of med), simethicone, Tamadol, vit d, Flovent Vicodin, losartan, orphenadrine  Changed: none    Patient was asked about OTC/herbal products specifically.  PTA med list reflects this.    In the past week, patient estimated taking medication this percent of the time:  greater than 90%.    Allergies were reviewed, assessed, and updated with the patient.      Patient did not bring any medications to the hospital and can't retrieve from home. No multi-dose medications are available for use during hospital stay.     The information provided in this note is only as accurate as the sources available at the time of the update(s).    Thank you for the opportunity to participate in the care of this  patient.    Sukh Lazna, [facility]  7/20/2021 1:41 PM

## 2021-07-20 NOTE — CONSULTS
[facility] Consult     Impression:     The patient is a 55-year-old female with history of multiple colon polyps, alcohol and marijuana use who was admitted with nausea vomiting and hematemesis by history.  She is had no hematemesis in the hospital.  She appears comfortable on physical exam at this time and with no abdominal pain.  She does not use narcotics.  Differential diagnosis includes: Gastritis from alcohol, marijuana side effects, Savannah-Wang tear, side effect of medications.  I think peptic ulcer disease is less likely.  At some point, she should have elective colonoscopy and if she remains stable hemodynamically with the stable hemoglobin, then she could have an elective endoscopy at the same clinic visit.  I discussed this with her at the bedside.  I doubt any bleeding is significant for normal BUN.  LFTs and lipase are normal does not suggest any underlying liver or pancreatic disease    Recommendation:     1.  Agree with n.p.o. except ice chips.  2.  Agree with IV Protonix as ordered.  3.  Serial hemoglobins as ordered.  4.  We will reassess her hemodynamics and hemoglobin in a.m. before considering the need for upper GI endoscopy this admission.  5.  She should have an elective EGD/colonoscopy once her symptoms have improved as an outpatient.  6.  Would discontinue alcohol and marijuana use which could be contributing to her nausea vomiting.    Name: Mary Frank    Medical Record #: [id number]    YOB: 1965    Date/Time: 7/20/2021/4:21 PM    Reason for Consultation: Rodrigue Cervantes MD has asked me to evaluate Mary Frank regarding hiatal hernia and upper GI bleed..    HPI:     The patient is a 55-year-old female who came to emergency room today because of chest pain, shortness of breath upper abdominal pain and reported hematemesis.  Her symptoms started last night about 10 PM.  She has a history notable for asthma, question Roy's esophagus, obesity, reflux, and  status post total hernia repair x 2 with the last surgery 3 years ago..  She also was found to have alcohol intoxication upon admission.  She drank a large amount of alcohol yesterday which is unusual for her.  She uses daily marijuana with last use yesterday.  Her last EGD at [facility] was in 2015 and she had a hiatal hernia and mild gastric erythema.  There was no Roy's esophagus seen on that examination.  Biopsies of the stomach and duodenum were unremarkable for Helicobacter pylori or celiac sprue.  She has a history of multiple adenomatous colon polyps. Her last colonoscopy was 2017 and it was recommended to have another surveillance examination in 2020.    At this time, she reports dizziness, lightheadedness, nausea, chest pain and vague abdominal pain.  She looks comfortable.  She has not had any hematemesis in the hospital.  She has had no bowel movements.  There is no fever.  Her blood alcohol was less than 10 this morning.    Review of Systems (ROS):    Complete ROS otherwise negative except for as above.    Past Medical History:  Past Medical History:   Diagnosis Date     Abdominal pain 4/6/2015    Overview:  CT ABDOMEN PELVIS [facility], 4/6/2015 Impression: No evidence of renal or ureteral calculus. No evidence of a suspicious mass or inflammatory change within the urinary system.      Asthma 6/13/2015    Overview:    Proair helps      Roy's esophagus 6/13/2015    Overview:   EGD age 28      Chronic sinusitis 6/13/2015     Esophageal reflux 6/13/2015     Essential hypertension 6/13/2015    Overview:     Losartan & Amlodipine      History of colonic polyps 5/6/2015    Overview:  Rectal polyp Tubulovillous adenoma consistent with an advanced adenoma. 5 other adenomatous polyps. One of the six adenomas was advanced based on size and appearance under the microscope.  It is recommended based on this along with the number of polyps that a colonoscopy be repeated in one year.  Random colon biopsies do not show  colitis.       Hyperlipidemia 5/23/2013    Overview:  She has dyslipidemia with elevated triglycerides on 5-23-13.      Lipoma 6/13/2015    Overview:       Nonspecific abnormal finding in stool contents 4/29/2015    Overview:  OCCULT BLOOD IFOBT STOOL was positive on 4-29-15.      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   Past Month at Unknown time     omeprazole (PRILOSEC) 20 MG capsule [OMEPRAZOLE (PRILOSEC) 20 MG CAPSULE] Take 20 mg by mouth daily before breakfast.   7/19/2021 at Unknown time       Current Facility-Administered Medications:      lidocaine (LMX4) cream, , Topical, Q1H PRN, Rodrigue Cervantes MD     lidocaine 1 % 0.1-1 mL, 0.1-1 mL, Other, Q1H PRN, Rodrigue Cervantes MD     melatonin tablet 1 mg, 1 mg, Oral, At Bedtime PRN, Rodrigue Cervantes MD     [START ON 7/21/2021] pantoprazole (PROTONIX) IV push injection 40 mg, 40 mg, Intravenous, Q24H, Rodrigue Cervantes MD     sodium chloride (PF) 0.9% PF flush 3 mL, 3 mL, Intracatheter, Q8H, Rodrigue Cervantes MD     sodium chloride (PF) 0.9% PF flush 3 mL, 3 mL, Intracatheter, q1 min prn, Rodrigue Cervantes MD     [COMPLETED] 0.9% sodium chloride BOLUS, 1,000 mL, Intravenous, Once, Stopped at 07/20/21 1119 **FOLLOWED BY** sodium chloride 0.9% infusion, , Intravenous, Continuous, Chen Franco PA-C, Stopped at 07/20/21 1600     sodium chloride 0.9% infusion, , Intravenous, Continuous, Rodrigue Cervantes MD, Last Rate: 100 mL/hr at 07/20/21 1616, Rate Change at 07/20/21 1616       Allergies: Amoxicillin, Penicillins, and Tramadol    Family History:  History reviewed. No pertinent family  "history.    Social History:  Social History     Tobacco Use     Smoking status: Former Smoker     Years: 2.00     Types: Cigarettes, Cigarettes     Quit date: 1994     Years since quittin.7     Smokeless tobacco: Never Used   Substance Use Topics     Alcohol use: Not Currently     Drug use: Not Currently       Physical Exam: /76 (BP Location: Right arm)   Pulse 78   Temp 98.4  F (36.9  C) (Oral)   Resp 22   Ht 1.549 m (5' 1\")   Wt 74.1 kg (163 lb 5 oz)   SpO2 96%   BMI 30.86 kg/m      General: NAD  Eyes: No scleral icterus or conjunctivitis  Oropharynx: WNL  Neck/Thyroid: No neck masses or thyromegaly  Pulmonary: Lungs are clear to auscultation bilaterally  Cardiovascular: Regular, no lower extermity edema   Gastrointestinal: Positive bowel sounds, soft, non-tender, no rebound or guarding. No HSM.  Lymph nodes: No cervical or supraclavicular lymphadenopathy  Skin: The patient is not jaundiced.  Back: No spinal/paraspinal tenderness, no CVA tenderness.  Neurologic: Alert and oriented ×3  Musculoskeletal:  Normal muscle tone    Labs:  CMP Results:   Recent Labs   Lab Test 21  0947      POTASSIUM 3.3*   CHLORIDE 102   CO2 19*   ANIONGAP 17   *   BUN 11   CR 0.74   BILITOTAL 1.3*   ALKPHOS 117   ALT 16   AST 15      CBC  Recent Labs   Lab 21  0947   WBC 10.7   RBC 5.02   HGB 14.0   HCT 41.3   MCV 82   MCH 27.9   MCHC 33.9   RDW 14.1        INRNo lab results found in last 7 days.   Lipase   Date Value Ref Range Status   2021 9 0 - 52 U/L Final       Radiology:  CT Chest Pulmonary Embolism w Contrast    Result Date: 2021  EXAM: CT CHEST PULMONARY EMBOLISM W CONTRAST LOCATION: [facility] DATE/TIME: 2021 11:58 AM INDICATION: PE suspected, low/intermediate prob, positive D-dimer, chest pain COMPARISON: CT abdomen and pelvis from 2021. TECHNIQUE: CT chest pulmonary angiogram during arterial phase injection of IV contrast. " Multiplanar reformats and MIP reconstructions were performed. Dose reduction techniques were used. CONTRAST: 100 cc of Isovue-370 FINDINGS: ANGIOGRAM CHEST: No pulmonary artery filling defects. The aorta is normal in caliber. LUNGS AND PLEURA: Mild dependent atelectasis. No effusions or suspicious pulmonary nodules. MEDIASTINUM/AXILLAE: Heart size is normal. No adenopathy. CORONARY ARTERY CALCIFICATION: None. UPPER ABDOMEN: Moderate-sized hiatal hernia. MUSCULOSKELETAL: Degenerative changes of the spine.     IMPRESSION: 1.  No pulmonary embolus or other acute findings. 2.  Moderate-sized hiatal hernia.                                                    Juno Simon MD  Thank you for the opportunity to participate in the care of this patient.   Please feel free to call me with any questions or concerns.  Phone number [phone].

## 2021-07-20 NOTE — ED PROVIDER NOTES
Emergency Department Encounter   NAME: Mary Frank ; AGE: 55 year old female ; YOB: 1965 ; MRN: [id number] ; EVALUATION DATE & TIME: 7/20/2021  8:45 AM ; PCP: No primary care provider on file.   ED PROVIDER: Chen Franco PA-C    Chief Complaint   Patient presents with     Chest Pain     Alcohol Intoxication       Medical Decision Making & Final Diagnosis     1. Gastritis, presence of bleeding unspecified, unspecified chronicity, unspecified gastritis type    2. Nausea with vomiting    3. Chest pain, unspecified type    4. Shortness of breath       Mary Frank is a 55 year old female with a relevant PMH of asthma, meehan's esophagus, HLD, HTN, GERD, obesity, DMT2, and s/p hiatal hernia repair who presents the ED for evaluation of chest pain, shortness of breath, upper abdominal pain, nausea, vomiting (patient reports bright red hematemesis), lightheadedness, and chills.  Symptoms started around 10 PM last night.    I considered multiple diagnoses including: ACS, PE, acute heart failure, aortic dissection, Savannah-Wang tear, PUD, acute cholecystitis, viral infection including COVID-19, pneumonia, pancreatitis, metabolic or electrolyte derangement, gastritis, and others    Exam: Notable for /102 and otherwise normal vital signs in a nontoxic but uncomfortable appearing woman.  5 out of 5 strength and sensation intact to light touch in all extremities.  Mild diffuse abdominal TTP.  No anterior chest wall TTP.  Clear lung sounds.  No peripheral edema or TTP to bilateral lower extremities.  Labs/EKG/Imaging: EKG reveals sinus rhythm.  Nonspecific T wave abnormality noted in V3 to V6 when compared to prior EKG June 2015.  No acute ST elevation or depression.  No pathologic arrhythmia.  Labs notable for no leukocytosis or severe anemia, negative troponin after 6 hours of symptoms, slightly elevated D-dimer, mild hypomagnesia at 1.5, and mild hypokalemia at 3.3.  CT PE study without acute  abnormality.  Hiatal hernia noted.  Impression: Gastritis; nausea with vomiting; chest pain; shortness of breath; patient presents with a variety of symptoms.  Considered broad differential.  Lower suspicion of ACS as I suspect most of this is coming from her GI system but she is still endorsing chest pain, EKG did have some increased T wave flattening in anterior lateral leads and she has a high heart score given risk factors and medication noncompliance.  Given this I do think she would benefit from observation to rule out ACS.  She was not given aspirin when she arrived given her history of endorsing hematemesis.  Nothing on my exam to suggest acute heart failure.  She does not have evidence of fluid overload and CT PE study negative for this.  No indication for BNP.  I do not think patient symptoms secondary to dissection.  She has no history of atherosclerotic disease that is known.  No connective tissue disorder.  She is noting paresthesias in bilateral hands but it is bilateral and she has no focal neurologic deficits.  Pain does not radiate into extremities.  Pulses are intact.  Vasculature looks normal in CT PE study.  She has no risk factors for PE but given her age I was unable to PERC her out so D-dimer was ordered and CT PE study is reassuringly negative.  CT PE study also negative for pneumonia, Savannah-Wang tear, or other acute abnormalities.  Considered a viral infection as a source of her symptoms, Covid test is negative and she has been vaccinated and given history of alcohol use yesterday suspect of this more likely than viral infection.  No fevers at home.  Labs are reassuring without evidence of biliary stasis, pancreatitis, or significant metabolic or electrolyte derangement.  Hemoglobin is stable and she is not tachycardic or hypotensive here.  She has had no episodes of hematemesis after several hours of being here.  I have a low suspicion that this is contributing to her presentation but  observation will help confirm that.  Plan: Of note, this patient has not been on any of her medications for the last 2 to 3 months.  I was planning on refilling her amlodipine and encouraging her to follow-up with primary care at discharge.  She may benefit from having these medications refilled prior to discharge from the hospital.  I went over the results of the blood work and imaging with the patient at the bedside.  She was given Zofran, Protonix, GI cocktail, magnesium, fluids, and potassium as well as Carafate here with moderate relief.  We discussed indications for admission given high risk comorbidities and evolving EKG and patient is agreeable.  Patient was given adequate time to ask questions, received appropriate answers, and agrees with the plan as written.        ED Course   8:40 PM I met and introduced myself to the patient. I gathered initial history and performed my physical exam. We discussed plan for initial workup.   12:21 PM I staffed the patient with Dr. Schuler  12:33 PM I rechecked and updated the patient on lab and imaging results. Patient is feeling better and tolerated oral intake. Discussed plans for discharge.  12:45 PM I spoke with the patient who is requesting admission into the hospital    PPE worn including surgical mask, gloves, surgical cap.    MEDICATIONS GIVEN IN THE EMERGENCY:   Medications   0.9% sodium chloride BOLUS (0 mLs Intravenous Stopped 7/20/21 1119)     Followed by   sodium chloride 0.9% infusion (0 mLs Intravenous Stopped 7/20/21 1600)   lidocaine 1 % 0.1-1 mL (has no administration in time range)   lidocaine (LMX4) cream (has no administration in time range)   sodium chloride (PF) 0.9% PF flush 3 mL (has no administration in time range)   sodium chloride (PF) 0.9% PF flush 3 mL (has no administration in time range)   melatonin tablet 1 mg (has no administration in time range)   sodium chloride 0.9% infusion ( Intravenous Rate/Dose Change 7/20/21 1616)   pantoprazole  (PROTONIX) IV push injection 40 mg (has no administration in time range)   pantoprazole (PROTONIX) IV push injection 40 mg (40 mg Intravenous Given 7/20/21 0943)   ondansetron (ZOFRAN) injection 4 mg (4 mg Intravenous Given 7/20/21 0941)   lidocaine (XYLOCAINE) 2 % 15 mL, alum & mag hydroxide-simethicone (MAALOX) 15 mL GI Cocktail (30 mLs Oral Given 7/20/21 1024)   magnesium sulfate 2 g in water intermittent infusion (0 g Intravenous Stopped 7/20/21 1118)   potassium chloride (KLOR-CON) Packet 40 mEq (40 mEq Oral Given 7/20/21 1205)   lidocaine (XYLOCAINE) 2 % 15 mL, alum & mag hydroxide-simethicone (MAALOX) 15 mL GI Cocktail (30 mLs Oral Given 7/20/21 1324)   metoclopramide (REGLAN) injection 10 mg (10 mg Intravenous Given 7/20/21 1341)   diphenhydrAMINE (BENADRYL) injection 25 mg (25 mg Intravenous Given 7/20/21 1341)   sucralfate (CARAFATE) tablet 1 g (1 g Oral Given 7/20/21 1347)      NEW PRESCRIPTIONS STARTED AT TODAY'S ER VISIT:  Current Discharge Medication List      START taking these medications    Details   ondansetron (ZOFRAN ODT) 4 MG ODT tab Take 1 tablet (4 mg) by mouth every 8 hours as needed for nausea  Qty: 10 tablet, Refills: 0           =================================================================   History   Patient information was obtained from: patient   Use of Intrepreter: N/A    Maryjoo Frank is a 55 year old female with a relevant PMH of asthma, meehan's esophagus, HLD, HTN, GERD, obesity, DMT2, and s/p hiatal hernia repair who presents the ED for evaluation of chest pain, shortness of breath, upper abdominal pain, nausea, vomiting (patient reports bright red hematemesis), lightheadedness, and chills.  Symptoms started around 10 PM last night.  Notes no exacerbating or alleviating factors to chest pain or abdominal pain.  She also is endorsing a month of a chronic cough which is unchanged and productive with white to clear phlegm and tingling in both of her hands.  States she typically  does not drink alcohol and drank a large amount of it yesterday mixing liquors and eating moonshine peaches.  Last drink was at 6 PM.  She also notes daily marijuana use with her last use yesterday.    She denies fever, sore throat, headache, vision changes, diarrhea, urinary symptoms, black or blood in her stool, numbness or swelling in her arms or legs, or new weakness.  She has no history of VTE, exogenous estrogen use, recent surgery or immobilization, cancer diagnosis or treatment in the last 6 months.  Her HTN, HLD, and DMT2 has been untreated for the last several months as patient does not have insurance and cannot afford her medications.  She has been vaccinated against Covid x2.    States she tried to take a Prilosec to help with her symptoms last night but threw it up.   ______________________________________________________________________  Past Medical History:   Diagnosis Date     Abdominal pain 2015     Asthma 2015     Roy's esophagus 2015     Chronic sinusitis 2015     Esophageal reflux 2015     Essential hypertension 2015     History of colonic polyps 2015     Hyperlipidemia 2013     Lipoma 2015     Nonspecific abnormal finding in stool contents 2015     Ovarian cyst 2015     Sebaceous cyst 2015     Tietze's disease 2015        History reviewed. No pertinent surgical history.    History reviewed. No pertinent family history.    Social History     Tobacco Use     Smoking status: Former Smoker     Years: 2.00     Types: Cigarettes, Cigarettes     Quit date: 1994     Years since quittin.7     Smokeless tobacco: Never Used   Substance Use Topics     Alcohol use: Not Currently     Drug use: Not Currently       REVIEW OF SYSTEMS:    Review of Systems   Constitutional: Positive for chills. Negative for fever.   HENT: Negative for congestion, sore throat and trouble swallowing.    Eyes: Negative for discharge and visual disturbance.  "  Respiratory: Positive for shortness of breath. Negative for cough.    Cardiovascular: Positive for chest pain. Negative for leg swelling.   Gastrointestinal: Positive for abdominal pain (upper), nausea and vomiting. Negative for diarrhea.        Positive for hematemesis (bright red)   Genitourinary: Negative for dysuria, frequency, hematuria and urgency.   Musculoskeletal: Negative for back pain, gait problem and neck pain.   Skin: Negative for rash and wound.   Neurological: Positive for light-headedness. Negative for weakness, numbness and headaches.   Psychiatric/Behavioral: Negative for behavioral problems and suicidal ideas.   All other systems reviewed and are negative.        Physical Exam   /76 (BP Location: Right arm)   Pulse 78   Temp 98.4  F (36.9  C) (Oral)   Resp 22   Ht 1.549 m (5' 1\")   Wt 74.1 kg (163 lb 5 oz)   SpO2 96%   BMI 30.86 kg/m      Physical Exam  Vitals and nursing note reviewed.   Constitutional:       General: She is not in acute distress.     Appearance: Normal appearance. She is not ill-appearing or toxic-appearing.      Comments: Notable for /102 and otherwise normal vital signs in a nontoxic but uncomfortable appearing woman.   HENT:      Head: Normocephalic and atraumatic.      Right Ear: Tympanic membrane, ear canal and external ear normal.      Left Ear: Tympanic membrane, ear canal and external ear normal.      Nose: Nose normal.      Mouth/Throat:      Mouth: Mucous membranes are moist.      Pharynx: Oropharynx is clear. No oropharyngeal exudate or posterior oropharyngeal erythema.   Eyes:      General: No scleral icterus.        Right eye: No discharge.         Left eye: No discharge.      Extraocular Movements: Extraocular movements intact.      Conjunctiva/sclera: Conjunctivae normal.   Cardiovascular:      Rate and Rhythm: Normal rate and regular rhythm.      Pulses: Normal pulses.      Heart sounds: Normal heart sounds. No murmur heard.     Pulmonary: "      Effort: Pulmonary effort is normal. No respiratory distress.      Breath sounds: Normal breath sounds. No stridor. No wheezing, rhonchi or rales.      Comments: Clear lung sounds.  Chest:      Chest wall: No tenderness.      Comments: No anterior chest wall TTP.  Clear lung sounds.  Abdominal:      General: Abdomen is flat. Bowel sounds are normal. There is no distension.      Palpations: Abdomen is soft.      Tenderness: There is no abdominal tenderness. There is no guarding or rebound.      Comments: Mild diffuse abdominal TTP.   Musculoskeletal:         General: No swelling, tenderness, deformity or signs of injury. Normal range of motion.      Cervical back: Normal range of motion and neck supple. No rigidity or tenderness.      Comments: 5 out of 5 strength and sensation intact to light touch in all extremities.  No peripheral edema or TTP to bilateral lower extremities.   Lymphadenopathy:      Cervical: No cervical adenopathy.   Skin:     General: Skin is warm and dry.      Coloration: Skin is not jaundiced.      Findings: No bruising, erythema or rash.   Neurological:      General: No focal deficit present.      Mental Status: She is alert and oriented to person, place, and time. Mental status is at baseline.      Cranial Nerves: No cranial nerve deficit.      Sensory: No sensory deficit.      Motor: No weakness.      Gait: Gait normal.   Psychiatric:         Mood and Affect: Mood normal.         Behavior: Behavior normal.         Judgment: Judgment normal.         Lab Work (Reviewed and Interpreted):   Labs Ordered and Resulted from Time of ED Arrival Up to the Time of Departure from the ED   D DIMER QUANTITATIVE - Abnormal; Notable for the following components:       Result Value    D-Dimer Quantitative 0.51 (*)     All other components within normal limits    Narrative:     This D-dimer assay is intended for use in conjunction with a clinical pretest probability assessment model to exclude pulmonary  embolism (PE) and deep venous thrombosis (DVT) in outpatients suspected of PE or DVT. The cut-off value is 0.50 ug/mL FEU.   BASIC METABOLIC PANEL - Abnormal; Notable for the following components:    Potassium 3.3 (*)     Carbon Dioxide (CO2) 19 (*)     Glucose 201 (*)     All other components within normal limits   MAGNESIUM - Abnormal; Notable for the following components:    Magnesium 1.5 (*)     All other components within normal limits   HEPATIC FUNCTION PANEL - Abnormal; Notable for the following components:    Bilirubin Total 1.3 (*)     All other components within normal limits   CBC WITH PLATELETS AND DIFFERENTIAL - Abnormal; Notable for the following components:    Absolute Neutrophils 9.0 (*)     Absolute Immature Granulocytes 0.1 (*)     All other components within normal limits   TROPONIN I - Normal   LIPASE - Normal   ETHYL ALCOHOL LEVEL - Normal   SARS-COV2 (COVID-19) VIRUS RT-PCR - Normal    Narrative:     Testing was performed using the leo  SARS-CoV-2 & Influenza A/B Assay on the leo  Nora  System.  This test should be ordered for the detection of SARS-COV-2 in individuals who meet SARS-CoV-2 clinical and/or epidemiological criteria. Test performance is unknown in asymptomatic patients.  This test is for in vitro diagnostic use under the FDA EUA for laboratories certified under CLIA to perform moderate and/or high complexity testing. This test has not been FDA cleared or approved.  A negative test does not rule out the presence of PCR inhibitors in the specimen or target RNA in concentration below the limit of detection for the assay. The possibility of a false negative should be considered if the patient's recent exposure or clinical presentation suggests COVID-19.  [facility] are certified under the Clinical Laboratory Improvement Amendments of 1988 (CLIA-88) as qualified to perform moderate and/or high complexity laboratory testing.   ISTAT TROPONIN POCT - Normal   EXTRA  RED TOP TUBE   PERIPHERAL IV CATHETER   CARDIAC CONTINUOUS MONITORING   CALL   COVID-19 VIRUS (CORONAVIRUS) BY PCR    Narrative:     The following orders were created for panel order Symptomatic COVID-19 Virus (Coronavirus) by PCR Nasopharyngeal.  Procedure                               Abnormality         Status                     ---------                               -----------         ------                     SARS-COV2 (COVID-19) Vir...[[id number]]  Normal              Final result                 Please view results for these tests on the individual orders.   CBC WITH PLATELETS & DIFFERENTIAL    Narrative:     The following orders were created for panel order CBC with platelets differential.  Procedure                               Abnormality         Status                     ---------                               -----------         ------                     CBC with platelets and d...[[id number]]  Abnormal            Final result                 Please view results for these tests on the individual orders.   EXTRA TUBE    Narrative:     The following orders were created for panel order Extra Tube ([place] Draw).  Procedure                               Abnormality         Status                     ---------                               -----------         ------                     Extra Red Top Tube[[id number]]                               Final result                 Please view results for these tests on the individual orders.       Imaging (Reviewed and Interpreted):   CT Chest Pulmonary Embolism w Contrast   Final Result   IMPRESSION:   1.  No pulmonary embolus or other acute findings.   2.  Moderate-sized hiatal hernia.      Echocardiogram Complete    (Results Pending)       EKG (Reviewed and Interpreted):   EKG results reviewed and interpreted by Dr. Dwight ED MD.   Time: 8:53  Rate: 79 BPM  QRS: 90 ms  QTC: 497 ms  Impression: Sinus rhythm. Nonspecific T wave abnormality. Abnormal  ECG.  Comparison: Compared to previous from 13-JUN-2015: Nonspecific T wave abnormality no evident in Anterolateral leads. QT has lengthened.    I have independently reviewed and interpreted this ECG and agree with the above findings. See scanned document for further details.         PROCEDURES:   n/a     I, Lalo Lockwood, am serving as a scribe to document services personally performed by Chen Franco PA-C, based on my observation and the provider's statements to me. I, Chen Franco PA-C attest that Lalo Lockwood is acting in a scribe capacity, has observed my performance of the services and has documented them in accordance with my direction.     Chen Franco PA-C   Emergency Medicine   [facility] EMERGENCY ROOM  [address] [facility] [id number] [phone]  Dept: [phone]      Chen Franco PA-C  07/20/21 8277       Chen Franco PA-C  07/20/21 7657

## 2021-07-20 NOTE — ED TRIAGE NOTES
States [place] river yesterday drinking patron and moonshine. Stopped drinking at 1600 and shortly thereafter began feeling nauseated. Gotten progressively worse with chest pain, nausea, vomiting and left arm numbness. Endorses shortness of breath

## 2021-07-21 ENCOUNTER — HOSPITAL ENCOUNTER (OUTPATIENT)
Dept: CARDIOLOGY | Facility: CLINIC | Age: 56
Setting detail: OBSERVATION
End: 2021-07-21
Attending: INTERNAL MEDICINE

## 2021-07-21 VITALS
DIASTOLIC BLOOD PRESSURE: 89 MMHG | WEIGHT: 165.7 LBS | HEIGHT: 61 IN | SYSTOLIC BLOOD PRESSURE: 158 MMHG | TEMPERATURE: 97.6 F | HEART RATE: 62 BPM | RESPIRATION RATE: 18 BRPM | OXYGEN SATURATION: 95 % | BODY MASS INDEX: 31.28 KG/M2

## 2021-07-21 LAB
ALBUMIN SERPL-MCNC: 3.1 G/DL (ref 3.5–5)
ALP SERPL-CCNC: 91 U/L (ref 45–120)
ALT SERPL W P-5'-P-CCNC: 12 U/L (ref 0–45)
ANION GAP SERPL CALCULATED.3IONS-SCNC: 6 MMOL/L (ref 5–18)
AST SERPL W P-5'-P-CCNC: 10 U/L (ref 0–40)
ATRIAL RATE - MUSE: 79 BPM
BILIRUB DIRECT SERPL-MCNC: 0.3 MG/DL
BILIRUB SERPL-MCNC: 1.3 MG/DL (ref 0–1)
BUN SERPL-MCNC: 10 MG/DL (ref 8–22)
CALCIUM SERPL-MCNC: 7.8 MG/DL (ref 8.5–10.5)
CHLORIDE BLD-SCNC: 111 MMOL/L (ref 98–107)
CO2 SERPL-SCNC: 23 MMOL/L (ref 22–31)
CREAT SERPL-MCNC: 0.75 MG/DL (ref 0.6–1.1)
DIASTOLIC BLOOD PRESSURE - MUSE: 102 MMHG
ERYTHROCYTE [DISTWIDTH] IN BLOOD BY AUTOMATED COUNT: 15 % (ref 10–15)
GFR SERPL CREATININE-BSD FRML MDRD: 89 ML/MIN/1.73M2
GLUCOSE BLD-MCNC: 117 MG/DL (ref 70–125)
HCT VFR BLD AUTO: 36.9 % (ref 35–47)
HGB BLD-MCNC: 11.9 G/DL (ref 11.7–15.7)
INR PPP: 1.16 (ref 0.85–1.15)
INTERPRETATION ECG - MUSE: NORMAL
MAGNESIUM SERPL-MCNC: 2.3 MG/DL (ref 1.8–2.6)
MCH RBC QN AUTO: 28.1 PG (ref 26.5–33)
MCHC RBC AUTO-ENTMCNC: 32.2 G/DL (ref 31.5–36.5)
MCV RBC AUTO: 87 FL (ref 78–100)
P AXIS - MUSE: 43 DEGREES
PHOSPHATE SERPL-MCNC: 2.6 MG/DL (ref 2.5–4.5)
PLATELET # BLD AUTO: 234 10E3/UL (ref 150–450)
POTASSIUM BLD-SCNC: 3.5 MMOL/L (ref 3.5–5)
PR INTERVAL - MUSE: 170 MS
PROT SERPL-MCNC: 5.4 G/DL (ref 6–8)
QRS DURATION - MUSE: 90 MS
QT - MUSE: 434 MS
QTC - MUSE: 497 MS
R AXIS - MUSE: -28 DEGREES
RBC # BLD AUTO: 4.24 10E6/UL (ref 3.8–5.2)
SODIUM SERPL-SCNC: 140 MMOL/L (ref 136–145)
SYSTOLIC BLOOD PRESSURE - MUSE: 178 MMHG
T AXIS - MUSE: -41 DEGREES
VENTRICULAR RATE- MUSE: 79 BPM
WBC # BLD AUTO: 7.6 10E3/UL (ref 4–11)

## 2021-07-21 PROCEDURE — 250N000013 HC RX MED GY IP 250 OP 250 PS 637: Performed by: PHYSICIAN ASSISTANT

## 2021-07-21 PROCEDURE — 83735 ASSAY OF MAGNESIUM: CPT | Performed by: INTERNAL MEDICINE

## 2021-07-21 PROCEDURE — 80053 COMPREHEN METABOLIC PANEL: CPT | Performed by: INTERNAL MEDICINE

## 2021-07-21 PROCEDURE — 85610 PROTHROMBIN TIME: CPT | Performed by: INTERNAL MEDICINE

## 2021-07-21 PROCEDURE — C9113 INJ PANTOPRAZOLE SODIUM, VIA: HCPCS | Performed by: INTERNAL MEDICINE

## 2021-07-21 PROCEDURE — 258N000003 HC RX IP 258 OP 636: Performed by: INTERNAL MEDICINE

## 2021-07-21 PROCEDURE — 36415 COLL VENOUS BLD VENIPUNCTURE: CPT | Performed by: INTERNAL MEDICINE

## 2021-07-21 PROCEDURE — 250N000013 HC RX MED GY IP 250 OP 250 PS 637: Performed by: INTERNAL MEDICINE

## 2021-07-21 PROCEDURE — 82248 BILIRUBIN DIRECT: CPT | Performed by: INTERNAL MEDICINE

## 2021-07-21 PROCEDURE — 93306 TTE W/DOPPLER COMPLETE: CPT | Mod: 26 | Performed by: INTERNAL MEDICINE

## 2021-07-21 PROCEDURE — 250N000011 HC RX IP 250 OP 636: Performed by: HOSPITALIST

## 2021-07-21 PROCEDURE — 84100 ASSAY OF PHOSPHORUS: CPT | Performed by: INTERNAL MEDICINE

## 2021-07-21 PROCEDURE — 93306 TTE W/DOPPLER COMPLETE: CPT

## 2021-07-21 PROCEDURE — 96361 HYDRATE IV INFUSION ADD-ON: CPT

## 2021-07-21 PROCEDURE — 85027 COMPLETE CBC AUTOMATED: CPT | Performed by: INTERNAL MEDICINE

## 2021-07-21 PROCEDURE — 96376 TX/PRO/DX INJ SAME DRUG ADON: CPT

## 2021-07-21 PROCEDURE — 99217 PR OBSERVATION CARE DISCHARGE: CPT | Performed by: INTERNAL MEDICINE

## 2021-07-21 PROCEDURE — G0378 HOSPITAL OBSERVATION PER HR: HCPCS

## 2021-07-21 PROCEDURE — 250N000011 HC RX IP 250 OP 636: Performed by: INTERNAL MEDICINE

## 2021-07-21 RX ORDER — FAMOTIDINE 20 MG/1
20 TABLET, FILM COATED ORAL 2 TIMES DAILY PRN
Status: DISCONTINUED | OUTPATIENT
Start: 2021-07-21 | End: 2021-07-21 | Stop reason: HOSPADM

## 2021-07-21 RX ORDER — AMLODIPINE BESYLATE 5 MG/1
5 TABLET ORAL 2 TIMES DAILY
Qty: 60 TABLET | Refills: 1 | Status: SHIPPED | OUTPATIENT
Start: 2021-07-21

## 2021-07-21 RX ORDER — OMEPRAZOLE 20 MG/1
20 TABLET, DELAYED RELEASE ORAL 2 TIMES DAILY
COMMUNITY
Start: 2021-07-21

## 2021-07-21 RX ADMIN — FAMOTIDINE 20 MG: 20 TABLET ORAL at 10:47

## 2021-07-21 RX ADMIN — ONDANSETRON 4 MG: 2 INJECTION INTRAMUSCULAR; INTRAVENOUS at 00:42

## 2021-07-21 RX ADMIN — ACETAMINOPHEN 650 MG: 325 TABLET ORAL at 05:16

## 2021-07-21 RX ADMIN — SODIUM CHLORIDE: 9 INJECTION, SOLUTION INTRAVENOUS at 05:13

## 2021-07-21 RX ADMIN — PANTOPRAZOLE SODIUM 40 MG: 40 INJECTION, POWDER, FOR SOLUTION INTRAVENOUS at 08:41

## 2021-07-21 RX ADMIN — AMLODIPINE BESYLATE 5 MG: 5 TABLET ORAL at 08:41

## 2021-07-21 RX ADMIN — ONDANSETRON 4 MG: 2 INJECTION INTRAMUSCULAR; INTRAVENOUS at 06:44

## 2021-07-21 ASSESSMENT — ACTIVITIES OF DAILY LIVING (ADL): DEPENDENT_IADLS:: INDEPENDENT

## 2021-07-21 ASSESSMENT — MIFFLIN-ST. JEOR: SCORE: 1278.99

## 2021-07-21 NOTE — DISCHARGE SUMMARY
[facility] MEDICINE  DISCHARGE SUMMARY     Primary Care Physician: System, Provider Not In  Admission Date: 7/20/2021   Discharge Provider: Rodrigue Cervantes MD Discharge Date: 7/21/2021   Diet:   Active Diet and Nourishment Order   Procedures     Regular Diet Adult     Diet       Code Status: Full Code   Activity: DCACTIVITY: Activity as tolerated        Condition at Discharge: Stable     REASON FOR PRESENTATION(See Admission Note for Details)   Blood tinged vomiting    PRINCIPAL & ACTIVE DISCHARGE DIAGNOSES     Active Problems:    Nausea with vomiting    Shortness of breath    Chest pain, unspecified type    Gastritis, presence of bleeding unspecified, unspecified chronicity, unspecified gastritis type      PENDING LABS     Unresulted Labs Ordered in the Past 30 Days of this Admission     No orders found for last 31 day(s).            PROCEDURES ( this hospitalization only)          RECOMMENDATIONS TO OUTPATIENT PROVIDER FOR F/U VISIT     Follow-up Appointments     Follow-up and recommended labs and tests       Follow up with primary care provider, Provider Not In System, within 7   days for hospital follow- up.  The following labs/tests are recommended:   EGD and colonoscopy with [facility]; they will try to arrange it as outpatient.  .           DISPOSITION     Home    SUMMARY OF HOSPITAL COURSE:      Mary Frank is a 55 year old old female with history of hypertension, GERD, hiatal hernia that has been surgically corrected twice in the past but she says has failed so far, presented with hematemesis and intractable nausea and vomiting last night after she drank moonshine.  Incidentally she also complained of chest pain and there were nonspecific ST-T wave changes in her EKG.     Patient was seen by gastroenterology.  She was started on Protonix.  There has been no significant drop in her hemoglobin so far.  Hemoglobin has remained relatively stable.  There was no incident of  hematemesis that was observed during her stay in the hospital.  Patient then will be converted to oral Prilosec 20 mg to twice daily.  Patient will also be given ondansetron ODT to control nausea.  Patient does take cannabis and she says that she had taken this for decades without any problem.  However I did advise her that it could contribute to new onset of cannabinoid hyperemesis syndrome.  Also she is very sensitive to alcohol so she was advised to avoid alcohol.  Plan is for her to have an outpatient EGD and possibly colonoscopy.      As for her hiatal hernia, she was advised to follow-up with the surgeon who did her previous laparoscopic surgery.  However she needs to have her insurance or medical assistance for this to be pursued.  She is working with a  about this.    She did have hypokalemia and hypomagnesemia which were corrected per protocol.    She did complain of chest pain which has been going on for 2 months which is not exertion related.  Its atypical in presentation.  EKG showed nonspecific ST-T wave changes.  Troponins were negative x3.  Echocardiogram was unremarkable.  We will schedule her for outpatient stress test.  Her D-dimer was elevated.  However CTA of the chest was negative for pulmonary embolism.    She does have essential hypertension as well.  She will continue her amlodipine 5 mg twice a day.  She used to be on losartan but apparently has not been taking that.  Her blood pressure has been in the 150 systolic and this needs to be monitored and her dose adjusted as an outpatient by her primary care physician.    Her diet was advanced and she was able to tolerate it.  She not complain of any further chest pain or shortness of breath.  There was no hematemesis observed during her stay in the hospital.  She was hemodynamically stable.  She expressed understanding agreement with above plans including plan for EGD, colonoscopy and an outpatient stress test.        Discharge  Medications with Med changes:     Current Discharge Medication List      START taking these medications    Details   omeprazole (PRILOSEC OTC) 20 MG EC tablet Take 1 tablet (20 mg) by mouth 2 times daily    Associated Diagnoses: Upper GI bleed      ondansetron (ZOFRAN ODT) 4 MG ODT tab Take 1 tablet (4 mg) by mouth every 8 hours as needed for nausea  Qty: 10 tablet, Refills: 0         CONTINUE these medications which have CHANGED    Details   amLODIPine (NORVASC) 5 MG tablet Take 1 tablet (5 mg) by mouth 2 times daily  Qty: 60 tablet, Refills: 1    Associated Diagnoses: Essential hypertension         CONTINUE these medications which have NOT CHANGED    Details   albuterol (PROVENTIL HFA;VENTOLIN HFA) 90 mcg/actuation inhaler [ALBUTEROL (PROVENTIL HFA;VENTOLIN HFA) 90 MCG/ACTUATION INHALER] Inhale 1-2 puffs every 4 (four) hours as needed for wheezing or shortness of breath.         STOP taking these medications       omeprazole (PRILOSEC) 20 MG capsule Comments:   Reason for Stopping:                     Rationale for medication changes:      Prilosec for dyspepsia and suspected upper GI bleed  Zofran for intractable nausea  Amlodipine for blood pressure control        Consults   GI    GASTROENTEROLOGY IP CONSULT    Immunizations given this encounter     Most Recent Immunizations   Administered Date(s) Administered     COVID-19DASH,Pfizer 04/09/2021           Anticoagulation Information      Recent INR results:   Recent Labs   Lab 07/21/21  0453   INR 1.16*     Warfarin doses (if applicable) or name of other anticoagulant: Not applicable.  Patient is not on anticoagulant.      SIGNIFICANT IMAGING FINDINGS     Results for orders placed or performed during the hospital encounter of 07/20/21   CT Chest Pulmonary Embolism w Contrast    Impression    IMPRESSION:  1.  No pulmonary embolus or other acute findings.  2.  Moderate-sized hiatal hernia.       SIGNIFICANT LABORATORY FINDINGS     Most Recent 3 CBC's:Recent Labs    Lab Test 07/21/21  0453 07/20/21  2343 07/20/21  1747 07/20/21  0947 05/19/21  1659   WBC 7.6  --   --  10.7 7.4   HGB 11.9 12.2 12.6 14.0 13.5   MCV 87  --   --  82 87     --   --  306 267     Most Recent 3 BMP's:Recent Labs   Lab Test 07/21/21  0453 07/20/21  1617 07/20/21  0947 05/19/21  1659     --  138 140   POTASSIUM 3.5 3.6 3.3* 4.2   CHLORIDE 111*  --  102 105   CO2 23  --  19* 26   BUN 10  --  11 16   CR 0.75  --  0.74 0.77   ANIONGAP 6  --  17 9   JUSTINE 7.8*  --  9.2 8.5     --  201* 92       Troponin negative x3.  D-dimer elevated 0.51  INR 1.16    Discharge Orders        Internal Medicine Referral      Reason for your hospital stay    Nausea and vomiting and blood in vomitus     Activity    Your activity upon discharge: activity as tolerated     Follow-up and recommended labs and tests     Follow up with primary care provider, Provider Not In System, within 7 days for hospital follow- up.  The following labs/tests are recommended: EGD and colonoscopy with [facility]; they will try to arrange it as outpatient.  .     Diet    Follow this diet upon discharge: Regular; avoid acidic food and sour food; avoid very greasy food; avoid carbonated drinks; avoid alcohol and cannabis     NM MPI Treadmill     NM MPI Treadmill   Patient is scheduled for outpatient stress test.    Examination   Physical Exam   Temp:  [97.6  F (36.4  C)-99.7  F (37.6  C)] 97.6  F (36.4  C)  Pulse:  [62-74] 62  Resp:  [18-20] 18  BP: ()/(62-90) 158/89  SpO2:  [93 %-98 %] 95 %  Wt Readings from Last 1 Encounters:   07/21/21 75.2 kg (165 lb 11.2 oz)       General Appearance: Alert and current not in distress.  Respiratory: Lungs are clear to auscultation no wheezing or crackles.  Cardiovascular: Regular heart rhythm with no significant murmur.  GI: Flat soft and no guarding.  No direct rebound tenderness.  No peritoneal signs.  Skin: Unremarkable.  No ulcerations or signs of infection.  Other: No focal neurologic  deficits.  Able to ambulate.  No signs of acute stroke.      Please see EMR for more detailed significant labs, imaging, consultant notes etc.    I, Rodrigue Cervantes MD, personally saw the patient today and spent greater than 30 minutes discharging this patient.    Rodrigue Cervantes MD  [facility]    CC:System, Provider Not In

## 2021-07-21 NOTE — PROGRESS NOTES
"GI PROGRESS NOTE  7/21/2021  Mary Frank  1965  /-33    Subjective:   Feeling better today. Had some epigastric abdominal pain this morning but this has now improved. This pain is not new for her. Tolerating clears. No emesis. No signs of overt GI bleeding.        Objective:     Blood pressure (!) 150/90, pulse 67, temperature 98.2  F (36.8  C), temperature source Oral, resp. rate 20, height 1.549 m (5' 1\"), weight 75.2 kg (165 lb 11.2 oz), SpO2 98 %.    Body mass index is 31.31 kg/m .  Gen: NAD  Cardio: RRR  GI: Obese, non-distended, BS positive, soft, non-tender    Laboratory:  CMP Results:   Recent Labs   Lab Test 07/21/21  0453      POTASSIUM 3.5   CHLORIDE 111*   CO2 23   ANIONGAP 6      BUN 10   CR 0.75   BILITOTAL 1.3*   ALKPHOS 91   ALT 12   AST 10      CBC  Recent Labs   Lab 07/21/21  0453 07/20/21  2343 07/20/21  1747 07/20/21  0947   WBC 7.6  --   --  10.7   RBC 4.24  --   --  5.02   HGB 11.9 12.2 12.6 14.0   HCT 36.9  --   --  41.3   MCV 87  --   --  82   MCH 28.1  --   --  27.9   MCHC 32.2  --   --  33.9   RDW 15.0  --   --  14.1     --   --  306     INR  Recent Labs   Lab 07/21/21  0453   INR 1.16*      Lipase   Date Value Ref Range Status   07/20/2021 9 0 - 52 U/L Final     Imaging:  EXAM: CT CHEST PULMONARY EMBOLISM W CONTRAST  LOCATION: [facility]   DATE/TIME: 7/20/2021 11:58 AM                                              IMPRESSION:  1.  No pulmonary embolus or other acute findings.  2.  Moderate-sized hiatal hernia.     Assessment:   1.  Nausea with vomiting  55 year-old female presenting with nausea, vomiting, abdominal pain and possible coffee ground emesis after a heavy night of drinking alcohol. Hemoglobin normal. Suspect due to alcoholic gastritis and/or GERD. Marijuana use can also lead to nausea and vomiting. She is clinically improving. Will advance diet and plan for outpatient EGD.   She is also overdue for colorectal cancer " screening, and she is agreeable to colonoscopy as well.     Plan:   1. Alcohol and marijuana cessation  2. Daily PPI, Pepcid as needed for breakthrough symptoms   3. EGD and colonoscopy as outpatient, [facility] will arrange.   4. Advance diet as tolerated                                                                        Sharri Chadwick PA-C  Thank you for the opportunity to participate in the care of this patient.   Please feel free to call me with any questions or concerns.  Phone number [phone].

## 2021-07-21 NOTE — PLAN OF CARE
PRIMARY DIAGNOSIS: CHEST PAIN  OUTPATIENT/OBSERVATION GOALS TO BE MET BEFORE DISCHARGE:  1. Ruled out acute coronary syndrome (negative or stable Troponin):  Yes  2. Pain Status: Pain free.  3. Appropriate provocative testing performed: N/A  - Stress Test Procedure:  NA  - Interpretation of cardiac rhythm per telemetry tech: NSR    4. Cleared by Consultants (if applicable):y    5. Return to near baseline physical activity: Yes  Discharge Planner Nurse   Safe discharge environment identified: Yes  Barriers to discharge:  Tolerance of regular diet.       Entered by: Judy Mesa 07/21/2021 1:57 PM     Please review provider order for any additional goals.   Nurse to notify provider when observation goals have been met and patient is ready for discharge.

## 2021-07-21 NOTE — PROGRESS NOTES
Pt discharge home with ; ambulatory; escorted by RN to frontdoor; see discharge AVS for further note.

## 2021-07-21 NOTE — PLAN OF CARE
Problem: Nausea and Vomiting  Goal: Fluid and Electrolyte Balance  Intervention: Prevent and Manage Nausea and Vomiting  Recent Flowsheet Documentation  Taken 7/21/2021 0000 by Rizwana Gunderson RN  Nausea/Vomiting Interventions: antiemetic     No episodes of vomiting. Antiemetic given after report of nausea (see MAR). Pt sleeping at this time.      Problem: Adult Inpatient Plan of Care  Goal: Optimal Comfort and Wellbeing  Outcome: Improving     No new reports of pain / headache. Pt sleeping at this time.

## 2021-07-21 NOTE — PLAN OF CARE
Problem: Adult Inpatient Plan of Care  Goal: Optimal Comfort and Wellbeing  Outcome: Improving     Problem: Nausea and Vomiting  Goal: Fluid and Electrolyte Balance  Outcome: Improving     Pt denies any vomiting episodes since admission. PRN tylenol given for headache. NPO.

## 2021-07-21 NOTE — PLAN OF CARE
Problem: Nausea and Vomiting  Goal: Fluid and Electrolyte Balance  Intervention: Prevent and Manage Nausea and Vomiting  Recent Flowsheet Documentation  Taken 7/21/2021 0000 by Rizwana Gunderson RN  Nausea/Vomiting Interventions: antiemetic     No episodes of vomiting. No new reports of nausea after PRN zofran given.        Problem: Adult Inpatient Plan of Care  Goal: Optimal Comfort and Wellbeing  Outcome: Improving     Pt complaints of headache. PRN tylenol given; pt stated it provided relief.

## 2021-07-21 NOTE — PROGRESS NOTES
[facility] MEDICINE PROGRESS NOTE      Mary Frank is a 55 year old old female with history of hypertension, GERD, hiatal hernia that has been surgically corrected twice in the past but she says has failed so far, presented with hematemesis and intractable nausea and vomiting last night after she drank moonshine.     Hospital course is notable for: Patient was seen by gastroenterology.  She was started on Protonix.  There has been no significant drop in her hemoglobin so far.  Echocardiogram is ordered.     Intractable nausea and vomiting with hematemesis  -Exacerbated by hiatal hernia  -Follow hemoglobin--> so far stable  -So far no significant blood loss based on hemoglobin  -IV Protonix initiated--> patient complains of the Protonix exacerbates her abdominal pain  -This seems to have been triggered by intake of alcohol although patient says she has no history of alcohol abuse.  -Patient does have history of alcohol use although she denies abuse.  Also THC use.  It would be best for her to discontinue both.  -GI consult--> notes appreciated.  Plan for EGD as an outpatient.  -ER reports that there was no episode of hematemesis observed during her ER stay or inside the hospital.  -Patient is placed on clear liquids and will advance as tolerated.  -Patient was advised about THC and alcohol.  She is sensitive to the latter especially.  -Consider advancing diet today    THC use with possible cannabinoid hyperemesis syndrome  -Patient says she has been smoking all her life  -Patient was advised that even later in life she can develop cannabinoid hyperemesis syndrome.     Chest pain with nonspecific ST-T wave changes EKG  -Chest pain seems to be chronic rather than acute this has been going on for 2 months  -Does not seem to be typical as this happens even at rest  -Echocardiogram is pending at this time.  Consider stress test as an outpatient if this is completely negative.  -Serial  troponins are negative.     Hiatal hernia, status post 2 previous surgeries.  -Had an open correction of that at the age of 28.    -She had a laparoscopic repair again 3 years ago.  -PPI  -Had improvement with a GI cocktail  -Patient says she is not able to follow-up with surgery because of insurance issues.  This may have to be dealt with as an outpatient.     Essential hypertension, controlled  -Patient is on amlodipine and losartan but has not been compliant with medications because of insurance issues.  Her medication has been decreased to just amlodipine at this time.  Apparently she has not been taking losartan.  -Optimize medications to control blood pressure if clinically indicated.    Malnutrition with weight loss  -Treat underlying hiatal hernia--> follow-up for surgical intervention.  -Diet modification with avoidance of acidic food, avoidance of alcohol as well as THC.     Hypokalemia and hypomagnesemia, corrected  -Replace per protocol  -Give IV fluids     Medications include Mestinon and tramadol and Norflex with patient says she is not on this medication and does not know the reason for these medications on her med list.  She has no chronic pain.  She has no history of myasthenia gravis as far she is concerned.    Diet: Clear Liquid Diet  DVT Prophylaxis: Low risk.  Avoid anticoagulant.  Code Status: Full Code    Anticipated possible discharge: Try to observe her today and advance her diet.  Await GI disposition.  Await for echocardiogram results.    Interval History/Subjective:  Per nursing report it was uneventful.  Patient denies any chest pain or shortness of breath during the night.  She was given her Protonix this morning and she complained of abdominal pain following administration of the medication.  I did speak with gastroenterology and plan is for her to have outpatient EGD as there has been no significant drop in her hemoglobin.  There were no episodes of hematemesis observed during her  stay in the hospital.  This is in contrast with her claim that she had 30 episodes of hematemesis at home.    Patient does admit to smoking weed regularly.  She is also very sensitive to spicy food as well as alcohol.  She cannot take sour foods.  She has lost about 15 pounds because of the limitation of her food intake.    Physical Exam/Objective:  Temp:  [98.1  F (36.7  C)-99.7  F (37.6  C)] 98.4  F (36.9  C)  Pulse:  [62-91] 62  Resp:  [7-37] 20  BP: ()/(62-96) 113/67  SpO2:  [91 %-100 %] 96 %    Body mass index is 31.31 kg/m .  Alert and coherent not in distress.  She denies any major pain at this time.  GENERAL:  Alert, appears comfortable, in no acute distress,   HEAD:  Normocephalic, without obvious abnormality, atraumatic   EYES:  Grossly normal; pupils unremarkable    NOSE: Grossly normal   THROAT: Lips, mucosa, and tongue   unremarkable;  mouth moist   NECK: No mass noted; no stiffness   BACK:      LUNGS:   Auscultation: Negative for wheezing or crackles, symmetric chest rise on inhalation, respirations unlabored    CHEST WALL:  No tenderness or deformity   HEART:  Regular rate and rhythm, significant murmurs:    ABDOMEN:   Soft, non-tender, bowel sounds normal, no peritoneal signs; no masses, no peritoneal signs   EXTREMITIES: Extremities normal, atraumatic, no cyanosis or edema    SKIN: Breaks in the skin:Negative; erythema and warmth: Negative   NEURO: Alert, oriented x3, no signs of acute stroke or change from prior state,    PSYCH: Cooperative, behavior is appropriate,      Medications:   Personally Reviewed.    amLODIPine  5 mg Oral BID     pantoprazole (PROTONIX) IV  40 mg Intravenous Q24H     sodium chloride (PF)  3 mL Intracatheter Q8H     Current Facility-Administered Medications   Medication Dose Route Frequency     acetaminophen  650 mg Oral Q4H PRN    Or     acetaminophen  650 mg Rectal Q4H PRN     albuterol  1-2 puff Inhalation Q4H PRN     famotidine  20 mg Oral BID PRN     lidocaine 4%    Topical Q1H PRN     lidocaine (buffered or not buffered)  0.1-1 mL Other Q1H PRN     melatonin  1 mg Oral At Bedtime PRN     ondansetron  4 mg Intravenous Q6H PRN     sodium chloride (PF)  3 mL Intracatheter q1 min prn       Cumulative essential/pertinent data reviewed:  Labs:  Metabolic panel shows improvement of the potassium.  Creatinine is normal.    Troponin negative x2  CBC unremarkable.  Hemoglobin dropped from 14.0 down to 12.2 and currently 11.9.     Imaging:  CT of the chest shows the following:IMPRESSION:  1.  No pulmonary embolus or other acute findings.  2.  Moderate-sized hiatal hernia.    EKG: Normal sinus rhythm.  Nonspecific ST-T wave changes.  No arrhythmia seen on telemetry.      Rodrigue Cervantes MD  [facility]  [facility]  [facility]  Phone: #[phone]

## 2021-07-21 NOTE — PLAN OF CARE
PRIMARY DIAGNOSIS: CHEST PAIN  OUTPATIENT/OBSERVATION GOALS TO BE MET BEFORE DISCHARGE:  1. Ruled out acute coronary syndrome (negative or stable Troponin):  Troponin neg x2  2. Pain Status: No c/o pain  3. Appropriate provocative testing performed: NA  - Stress Test Procedure: No test ordered.  - Interpretation of cardiac rhythm per telemetry tech: Sinus puja    4. Cleared by Consultants (if applicable): GI will do an EGD as an outpt unless hgb starts to drop.  5. Return to near baseline physical activity: Yes  Discharge Planner Nurse   Safe discharge environment identified: yes  Barriers to discharge: none       Entered by: Judy Mesa 07/21/2021 9:24 AM     Please review provider order for any additional goals.   Nurse to notify provider when observation goals have been met and patient is ready for discharge.

## 2021-10-16 ENCOUNTER — HEALTH MAINTENANCE LETTER (OUTPATIENT)
Age: 56
End: 2021-10-16

## 2022-07-17 ENCOUNTER — HEALTH MAINTENANCE LETTER (OUTPATIENT)
Age: 57
End: 2022-07-17

## 2022-09-25 ENCOUNTER — HEALTH MAINTENANCE LETTER (OUTPATIENT)
Age: 57
End: 2022-09-25

## 2022-12-13 ENCOUNTER — HOSPITAL ENCOUNTER (EMERGENCY)
Facility: CLINIC | Age: 57
Discharge: HOME | End: 2022-12-13
Attending: STUDENT IN AN ORGANIZED HEALTH CARE EDUCATION/TRAINING PROGRAM | Admitting: STUDENT IN AN ORGANIZED HEALTH CARE EDUCATION/TRAINING PROGRAM

## 2022-12-13 VITALS
OXYGEN SATURATION: 99 % | HEIGHT: 61 IN | BODY MASS INDEX: 32.93 KG/M2 | WEIGHT: 174.4 LBS | SYSTOLIC BLOOD PRESSURE: 168 MMHG | DIASTOLIC BLOOD PRESSURE: 108 MMHG | RESPIRATION RATE: 22 BRPM | HEART RATE: 79 BPM | TEMPERATURE: 97.9 F

## 2022-12-13 DIAGNOSIS — R06.02 SHORTNESS OF BREATH: ICD-10-CM

## 2022-12-13 DIAGNOSIS — U07.1 COVID-19: ICD-10-CM

## 2022-12-13 PROCEDURE — 99282 EMERGENCY DEPT VISIT SF MDM: CPT

## 2022-12-13 NOTE — ED NOTES
Patient discharged home with AVS. Dr. Medley assessed patient in ER triage. See MD note for assessment. Vitals stable on RA. Patient felt comfortable going home with instructions for COVID care. All questions answered.

## 2022-12-13 NOTE — ED PROVIDER NOTES
EMERGENCY DEPARTMENT ENCOUNTER       ED Course & Medical Decision Making     6:16 AM I met with the patient and performed my initial interview and exam     Final Impression  57 year old female presents for persistent COVID-19 symptoms.  Patient tested positive for COVID-19 on 12/6/2022, this appears to be outside the window for oral antiviral treatments.  Does appear to be vaccinated x3 for COVID-19.  On initial exam patient in no acute distress, heart and lungs clear to auscultation bilaterally.  Vital signs stable.  No appreciable wheezing or respiratory distress.  Patient not tachycardic, saturations 100% on room air at time of my exam, speaking in full sentences.  No concerning signs or symptoms of failure to tolerate oral intake, does appear to be a good candidate for oral fluid hydration at home, continued over-the-counter antipyretics as needed for fevers or chills, return to ED if symptoms worsen.  Patient agreeable to this plan, is comfortable foregoing any other work-up like imaging, EKG, or other lab work.  Will discharge home with return precautions and instructions for continued good supportive cares at home.    Prior to making a final disposition on this patient the results of patient's tests and other diagnostic studies were discussed with the patient. All questions were answered. Patient expressed understanding of the plan and was amenable to it.    Medications - No data to display    Final Impression     1. COVID-19      Chief Complaint     Chief Complaint   Patient presents with     COVID POSITIVE     Chills     Sweats     Shortness of Breath     HPI     Mary Frank is a 57 year old female who presents for evaluation of COVID-19 symptoms    Patient reports they tested positive for COVID a week ago on 12/6. Their symptoms started on 12/4 with shortness of breath and chest pain. The patient has a history of asthma and has been using their albuterol inhaler to help with their shortness of breath.  They report that everyone in their family has COVID and they are the last one to still have symptoms.     I, Azul Harvey am serving as a scribe to document services personally performed by Dr. Duy Medley MD, based on my observation and the provider's statements to me. I, Dr. Duy Medley MD attest that Azul Gabriel is acting in a scribe capacity, has observed my performance of the services and has documented them in accordance with my direction.    Past Medical History     Past Medical History:   Diagnosis Date     Abdominal pain 2015     Asthma 2015     Roy's esophagus 2015     Chronic sinusitis 2015     Esophageal reflux 2015     Essential hypertension 2015     History of colonic polyps 2015     Hyperlipidemia 2013     Lipoma 2015     Nonspecific abnormal finding in stool contents 2015     Ovarian cyst 2015     Sebaceous cyst 2015     Tietze's disease 2015     No past surgical history on file.  No family history on file.   Social History     Tobacco Use     Smoking status: Former     Years: 2.00     Types: Cigarettes     Quit date: 1994     Years since quittin.1     Smokeless tobacco: Never   Substance Use Topics     Alcohol use: Not Currently     Drug use: Not Currently     Allergies   Allergen Reactions     Amoxicillin Palpitations     Penicillins Palpitations     Tramadol Unknown       Relevant past medical, surgical, family and social history as documented above, has been reviewed and discussed with patient. No changes or additions, unless otherwise noted in the HPI.    Current Medications     albuterol (PROVENTIL HFA;VENTOLIN HFA) 90 mcg/actuation inhaler  amLODIPine (NORVASC) 5 MG tablet  omeprazole (PRILOSEC OTC) 20 MG EC tablet        Review of Systems     Respiratory: Denies cough    Positive for shortness of breath   Cardiovascular:   Positive for chest pain    GI: Denies abdominal pain, nausea, vomiting    Remainder  "of systems reviewed, unless noted in HPI all others negative.    Physical Exam     BP (!) 168/108 (BP Location: Left arm, Patient Position: Semi-Mejias's, Cuff Size: Adult Regular)   Pulse 79   Temp 97.9  F (36.6  C) (Oral)   Resp 22   Ht 1.549 m (5' 1\")   Wt 79.1 kg (174 lb 6.4 oz)   SpO2 99%   BMI 32.95 kg/m    Constitutional: Awake, alert, in no acute distress.      Head: Normocephalic, atraumatic.      ENT: Mucous membranes moist.      Eyes: Conjunctiva normal.      Respiratory: Respirations even, unlabored. Lungs clear to ascultation bilaterally, coarseness, crackles, or wheezing in no acute respiratory distress.      Cardiovascular: Regular rate and rhythm. +2 radial pulses, equal bilaterally. No pitting edema.      GI: Abdomen soft, non-tender    Musculoskeletal: Moves all 4 extremities equally, strength symmetrical on bilateral uppers and lowers.      Integument: Warm, dry.   Neurologic: Alert & oriented x 3. Normal speech. Grossly normal motor and sensory function.  Psychiatric: Normal mood and affect.         Labs & Imaging           Duy Medley MD  12/13/22 4622    "

## 2022-12-13 NOTE — ED TRIAGE NOTES
Patient presents to the ED with known COVID positive test at home on Tuesday. Symptoms started last Sunday. History of asthma and has been using her rescue inhaler. Chest pain to front of chest rated 8/10 in triage. 99% on room air. Able to speak in full sentences.     Triage Assessment     Row Name 12/13/22 0617       Respiratory WDL    Respiratory WDL X;rhythm/pattern;cough    Rhythm/Pattern, Respiratory dyspnea on exertion;shortness of breath    Cough Frequency infrequent

## 2022-12-13 NOTE — DISCHARGE INSTRUCTIONS
For your body aches/fevers we recommend trying;  Tylenol 1,000mg every 6 hours (as needed), up to 4,000mg/day  Ibuprofen 600 to 800mg every 6 hours (as needed), up to 3,200mg/day    Stay well hydrated, get plenty of rest!

## 2023-08-05 ENCOUNTER — HEALTH MAINTENANCE LETTER (OUTPATIENT)
Age: 58
End: 2023-08-05

## 2024-09-28 ENCOUNTER — HEALTH MAINTENANCE LETTER (OUTPATIENT)
Age: 59
End: 2024-09-28